# Patient Record
Sex: FEMALE | Race: WHITE | HISPANIC OR LATINO | Employment: FULL TIME | URBAN - METROPOLITAN AREA
[De-identification: names, ages, dates, MRNs, and addresses within clinical notes are randomized per-mention and may not be internally consistent; named-entity substitution may affect disease eponyms.]

---

## 2019-02-22 ENCOUNTER — OFFICE VISIT (OUTPATIENT)
Dept: URGENT CARE | Facility: PHYSICIAN GROUP | Age: 24
End: 2019-02-22
Payer: COMMERCIAL

## 2019-02-22 VITALS
HEIGHT: 63 IN | WEIGHT: 125 LBS | SYSTOLIC BLOOD PRESSURE: 104 MMHG | TEMPERATURE: 99.2 F | DIASTOLIC BLOOD PRESSURE: 74 MMHG | BODY MASS INDEX: 22.15 KG/M2 | HEART RATE: 92 BPM | RESPIRATION RATE: 13 BRPM | OXYGEN SATURATION: 99 %

## 2019-02-22 DIAGNOSIS — H66.91 RIGHT OTITIS MEDIA, UNSPECIFIED OTITIS MEDIA TYPE: ICD-10-CM

## 2019-02-22 PROCEDURE — 99203 OFFICE O/P NEW LOW 30 MIN: CPT | Performed by: FAMILY MEDICINE

## 2019-02-22 RX ORDER — AMOXICILLIN 875 MG/1
875 TABLET, COATED ORAL 2 TIMES DAILY
Qty: 14 TAB | Refills: 0 | Status: SHIPPED | OUTPATIENT
Start: 2019-02-22 | End: 2019-03-01

## 2019-02-22 ASSESSMENT — ENCOUNTER SYMPTOMS
MYALGIAS: 1
DIARRHEA: 0
WEIGHT LOSS: 0
EYE REDNESS: 0
EYE DISCHARGE: 0
VOMITING: 0
HEADACHES: 0

## 2019-02-22 NOTE — PROGRESS NOTES
"Subjective:      Tyesha Duran is a 23 y.o. female who presents with Otalgia (Right ear. Cold. Onset sunday)            5 days nasal congestion. No fever. 1 right earache. Muffled hearing. No discharge from hear. Intermittent tinnitus. No recent injury. No trauma/barotrauma. No PMH otitis media as adult. +PMH as child  No ST. Productive cough without blood in sputu. No SOB/wheeze. OTC cold medication with some improvement. No other aggravating or alleviating factors.          Review of Systems   Constitutional: Positive for malaise/fatigue. Negative for weight loss.   Eyes: Negative for discharge and redness.   Gastrointestinal: Negative for diarrhea and vomiting.   Musculoskeletal: Positive for myalgias. Negative for joint pain.   Skin: Negative for itching and rash.   Neurological: Negative for headaches.       .  Medications, Allergies, and current problem list reviewed today in Epic     Objective:     /74   Pulse 92   Temp 37.3 °C (99.2 °F)   Resp 13   Ht 1.6 m (5' 3\")   Wt 56.7 kg (125 lb)   SpO2 99%   BMI 22.14 kg/m²      Physical Exam   Constitutional: She is oriented to person, place, and time. She appears well-developed and well-nourished. No distress.   HENT:   Head: Normocephalic and atraumatic.   Left Ear: External ear normal.   Mouth/Throat: Oropharynx is clear and moist.   Right TM partially visualized due to cerumen and appears red.      Eyes: Conjunctivae are normal.   Neck: Neck supple.   Cardiovascular: Normal rate, regular rhythm and normal heart sounds.    No murmur heard.  Pulmonary/Chest: Effort normal and breath sounds normal.   Lymphadenopathy:     She has no cervical adenopathy.   Neurological: She is alert and oriented to person, place, and time.   Skin: Skin is warm.               Assessment/Plan:     1. Right otitis media, unspecified otitis media type  amoxicillin (AMOXIL) 875 MG tablet     Differential diagnosis, natural history, supportive care, and indications for " immediate follow-up discussed at length.     Decongestant    May take wait and see approach with abx.

## 2019-02-22 NOTE — LETTER
February 22, 2019         Patient: Tyesha Duran   YOB: 1995   Date of Visit: 2/22/2019           To Whom it May Concern:    Tyesha Duran was seen in my clinic on 2/22/2019. Please excuse today.       Sincerely,           Salty Rabago M.D.  Electronically Signed

## 2019-04-19 ENCOUNTER — OFFICE VISIT (OUTPATIENT)
Dept: URGENT CARE | Facility: PHYSICIAN GROUP | Age: 24
End: 2019-04-19
Payer: COMMERCIAL

## 2019-04-19 VITALS
HEIGHT: 63 IN | TEMPERATURE: 100.1 F | DIASTOLIC BLOOD PRESSURE: 62 MMHG | BODY MASS INDEX: 21.26 KG/M2 | OXYGEN SATURATION: 98 % | HEART RATE: 130 BPM | SYSTOLIC BLOOD PRESSURE: 100 MMHG | WEIGHT: 120 LBS

## 2019-04-19 DIAGNOSIS — R55 SYNCOPE, UNSPECIFIED SYNCOPE TYPE: ICD-10-CM

## 2019-04-19 PROCEDURE — 99213 OFFICE O/P EST LOW 20 MIN: CPT | Performed by: FAMILY MEDICINE

## 2019-04-19 ASSESSMENT — ENCOUNTER SYMPTOMS
AURA: 0
SYNCOPE: 1
FEVER: 1
BOWEL INCONTINENCE: 0
NAUSEA: 0
CONFUSION: 0
HEADACHES: 1
SLURRED SPEECH: 0
LIGHT-HEADEDNESS: 1

## 2019-04-19 NOTE — PROGRESS NOTES
"Subjective:   Tyesha Meyers is a 23 y.o. female who presents for Syncope (light headed x 1 day )     Syncope   This is a new problem. The current episode started today. The problem has been resolved. She lost consciousness for a period of less than 1 minute. The symptoms are aggravated by standing. Associated symptoms include a fever, headaches and light-headedness. Pertinent negatives include no aura, bladder incontinence, bowel incontinence, confusion, nausea or slurred speech.     Review of Systems   Constitutional: Positive for fever.   Cardiovascular: Positive for syncope.   Gastrointestinal: Negative for bowel incontinence and nausea.   Genitourinary: Negative for bladder incontinence.   Neurological: Positive for light-headedness and headaches.   Psychiatric/Behavioral: Negative for confusion.      Objective:   /62 (BP Location: Left arm, Patient Position: Sitting, BP Cuff Size: Adult)   Pulse (!) 130   Temp 37.8 °C (100.1 °F)   Ht 1.6 m (5' 3\")   Wt 54.4 kg (120 lb)   SpO2 98%   BMI 21.26 kg/m²   Physical Exam   Constitutional: She is oriented to person, place, and time. She appears well-developed and well-nourished. No distress.   HENT:   Head: Normocephalic and atraumatic.   Eyes: Pupils are equal, round, and reactive to light. Conjunctivae and EOM are normal.   Cardiovascular: Normal rate and regular rhythm.    No murmur heard.  Pulmonary/Chest: Effort normal and breath sounds normal. No respiratory distress.   Abdominal: Soft. She exhibits no distension. There is no tenderness.   Neurological: She is alert and oriented to person, place, and time. She has normal reflexes. No sensory deficit.   Skin: Skin is warm and dry.   Psychiatric: She has a normal mood and affect.        Assessment/Plan:   1. Syncope, unspecified syncope type  Unclear etiology though likely related to evolving illness. RTC PRN progressive symptoms. Recommend oral hydration protocols.  Differential diagnosis, " natural history, supportive care, and indications for immediate follow-up discussed.

## 2019-04-19 NOTE — LETTER
April 19, 2019         Patient: Tyesha Meyers   YOB: 1995   Date of Visit: 4/19/2019           To Whom it May Concern:    Tyesha Meyers was seen in my clinic on 4/19/2019..    If you have any questions or concerns, please don't hesitate to call.        Sincerely,           Ted Ortiz M.D.  Electronically Signed

## 2019-04-19 NOTE — PATIENT INSTRUCTIONS
Syncope  Syncope is when you temporarily lose consciousness. Syncope may also be called fainting or passing out. It is caused by a sudden decrease in blood flow to the brain. Even though most causes of syncope are not dangerous, syncope can be a sign of a serious medical problem. Signs that you may be about to faint include:  · Feeling dizzy or light-headed.  · Feeling nauseous.  · Seeing all white or all black in your field of vision.  · Having cold, clammy skin.  If you fainted, get medical help right away.Call your local emergency services (911 in the [place] Do not drive yourself to the hospital.  Follow these instructions at home:  Pay attention to any changes in your symptoms. Take these actions to help with your condition:  · Have someone stay with you until you feel stable.  · Do not drive, use machinery, or play sports until your health care provider says it is okay.  · Keep all follow-up visits as told by your health care provider. This is important.  · If you start to feel like you might faint, lie down right away and raise (elevate) your feet above the level of your heart. Breathe deeply and steadily. Wait until all of the symptoms have passed.  · Drink enough fluid to keep your urine clear or pale yellow.  · If you are taking blood pressure or heart medicine, get up slowly and take several minutes to sit and then stand. This can reduce dizziness.  · Take over-the-counter and prescription medicines only as told by your health care provider.  Get help right away if:  · You have a severe headache.  · You have unusual pain in your chest, abdomen, or back.  · You are bleeding from your mouth or rectum, or you have black or tarry stool.  · You have a very fast or irregular heartbeat (palpitations).  · You have pain with breathing.  · You faint once or repeatedly.  · You have a seizure.  · You are confused.  · You have trouble walking.  · You have severe weakness.  · You have vision problems.  These symptoms  may represent a serious problem that is an emergency. Do not wait to see if your symptoms will go away. Get medical help right away. Call your local emergency services (911 in the [place] Do not drive yourself to the hospital.   This information is not intended to replace advice given to you by your health care provider. Make sure you discuss any questions you have with your health care provider.  Document Released: 12/18/2006 Document Revised: 05/25/2017 Document Reviewed: 08/31/2016  Elsevier Interactive Patient Education © 2017 [employer]  -

## 2019-12-13 ENCOUNTER — NON-PROVIDER VISIT (OUTPATIENT)
Dept: OCCUPATIONAL MEDICINE | Facility: CLINIC | Age: 24
End: 2019-12-13

## 2019-12-13 DIAGNOSIS — Z02.1 PRE-EMPLOYMENT HEALTH SCREENING EXAMINATION: ICD-10-CM

## 2019-12-13 DIAGNOSIS — Z02.1 PRE-EMPLOYMENT DRUG SCREENING: ICD-10-CM

## 2019-12-13 LAB
AMP AMPHETAMINE: NORMAL
COC COCAINE: NORMAL
INT CON NEG: NORMAL
INT CON POS: NORMAL
MET METHAMPHETAMINES: NORMAL
OPI OPIATES: NORMAL
PCP PHENCYCLIDINE: NORMAL
POC DRUG COMMENT 753798-OCCUPATIONAL HEALTH: NEGATIVE
THC: NORMAL

## 2019-12-13 PROCEDURE — 80305 DRUG TEST PRSMV DIR OPT OBS: CPT | Performed by: NURSE PRACTITIONER

## 2021-02-16 ENCOUNTER — OFFICE VISIT (OUTPATIENT)
Dept: URGENT CARE | Facility: PHYSICIAN GROUP | Age: 26
End: 2021-02-16
Payer: COMMERCIAL

## 2021-02-16 VITALS
WEIGHT: 137.4 LBS | TEMPERATURE: 98.2 F | BODY MASS INDEX: 24.34 KG/M2 | SYSTOLIC BLOOD PRESSURE: 116 MMHG | HEART RATE: 85 BPM | DIASTOLIC BLOOD PRESSURE: 70 MMHG | HEIGHT: 63 IN | OXYGEN SATURATION: 98 % | RESPIRATION RATE: 16 BRPM

## 2021-02-16 DIAGNOSIS — L28.2 PRURITIC RASH: ICD-10-CM

## 2021-02-16 PROCEDURE — 99204 OFFICE O/P NEW MOD 45 MIN: CPT | Performed by: PHYSICIAN ASSISTANT

## 2021-02-16 RX ORDER — TRIAMCINOLONE ACETONIDE 1 MG/G
OINTMENT TOPICAL
Qty: 45 G | Refills: 0 | Status: SHIPPED | OUTPATIENT
Start: 2021-02-16 | End: 2021-03-10

## 2021-02-17 ENCOUNTER — TELEPHONE (OUTPATIENT)
Dept: SCHEDULING | Facility: IMAGING CENTER | Age: 26
End: 2021-02-17

## 2021-02-17 ASSESSMENT — ENCOUNTER SYMPTOMS
CHILLS: 0
SHORTNESS OF BREATH: 0
FEVER: 0

## 2021-02-17 NOTE — PROGRESS NOTES
"  Subjective:   Armin Duran is a 25 y.o. female who presents today with   Chief Complaint   Patient presents with   • Allergic Reaction     poss allergic reaction, neck itchiness, rash located on shoulder/back, x2 days        Rash  This is a new problem. Episode onset: 2 days. The problem is unchanged. The rash is diffuse. The rash is characterized by itchiness and redness. Pertinent negatives include no fever or shortness of breath. Past treatments include antihistamine. The treatment provided mild relief.   No new soaps, detergents or potential exposure to other allergens.  No other systemic symptoms.    PMH:  has no past medical history on file.  MEDS:   Current Outpatient Medications:   •  triamcinolone acetonide (KENALOG) 0.1 % Ointment, Apply thin layer to affected area twice daily for up to 2 weeks., Disp: 45 g, Rfl: 0  ALLERGIES: No Known Allergies  SURGHX: History reviewed. No pertinent surgical history.  SOCHX:  reports that she has never smoked. She has never used smokeless tobacco. She reports previous alcohol use.  FH: Reviewed with patient, not pertinent to this visit.       Review of Systems   Constitutional: Negative for chills and fever.   Respiratory: Negative for shortness of breath.    Skin: Positive for itching and rash.        Objective:   /70 (BP Location: Right arm, Patient Position: Sitting, BP Cuff Size: Adult)   Pulse 85   Temp 36.8 °C (98.2 °F) (Temporal)   Resp 16   Ht 1.6 m (5' 3\")   Wt 62.3 kg (137 lb 6.4 oz)   SpO2 98%   BMI 24.34 kg/m²   Physical Exam  Vitals and nursing note reviewed.   Constitutional:       General: She is not in acute distress.     Appearance: Normal appearance. She is well-developed. She is not ill-appearing or toxic-appearing.   HENT:      Head: Normocephalic and atraumatic.      Right Ear: Hearing normal.      Left Ear: Hearing normal.      Mouth/Throat:      Comments: Patent airway. No lip or tongue swelling  Eyes:      Conjunctiva/sclera: " Conjunctivae normal.   Cardiovascular:      Rate and Rhythm: Normal rate and regular rhythm.      Heart sounds: Normal heart sounds.   Pulmonary:      Effort: Pulmonary effort is normal.      Breath sounds: Normal breath sounds. No wheezing, rhonchi or rales.   Musculoskeletal:      Comments: Normal movement in all 4 extremities   Skin:     General: Skin is warm and dry.             Comments: Diffuse papular rash with mild erythema.    Neurological:      Mental Status: She is alert.      Coordination: Coordination normal.   Psychiatric:         Mood and Affect: Mood normal.           Assessment/Plan:   Assessment    1. Pruritic rash  - triamcinolone acetonide (KENALOG) 0.1 % Ointment; Apply thin layer to affected area twice daily for up to 2 weeks.  Dispense: 45 g; Refill: 0  Recommend use of non drowsy antihistamine OTC per 's instructions such as Zyrtec. Likely contact dermatitis unknown cause. Offered allergy referral.   Differential diagnosis, natural history, supportive care, and indications for immediate follow-up discussed.   Patient given instructions and understanding of medications and treatment.    No signs of angioedema and no steroids or injection in clinic warranted although we did discuss this option and patient agreeable with using oral antihistamine instead as it has been working.  If not improving in 3-5 days, F/U with PCP or return to  if symptoms worsen.    Patient agreeable to plan.      Please note that this dictation was created using voice recognition software. I have made every reasonable attempt to correct obvious errors, but I expect that there are errors of grammar and possibly content that I did not discover before finalizing the note.    Omari Gamino PA-C

## 2021-03-10 ENCOUNTER — OFFICE VISIT (OUTPATIENT)
Dept: MEDICAL GROUP | Facility: PHYSICIAN GROUP | Age: 26
End: 2021-03-10
Payer: COMMERCIAL

## 2021-03-10 VITALS
SYSTOLIC BLOOD PRESSURE: 90 MMHG | RESPIRATION RATE: 16 BRPM | WEIGHT: 138.8 LBS | HEART RATE: 87 BPM | DIASTOLIC BLOOD PRESSURE: 56 MMHG | OXYGEN SATURATION: 98 % | TEMPERATURE: 97.8 F | BODY MASS INDEX: 24.59 KG/M2 | HEIGHT: 63 IN

## 2021-03-10 DIAGNOSIS — T78.40XD ALLERGY, SUBSEQUENT ENCOUNTER: ICD-10-CM

## 2021-03-10 DIAGNOSIS — R42 EPISODIC LIGHTHEADEDNESS: ICD-10-CM

## 2021-03-10 DIAGNOSIS — Z87.898 HISTORY OF ANGIOEDEMA: ICD-10-CM

## 2021-03-10 DIAGNOSIS — Z23 NEED FOR VACCINATION: ICD-10-CM

## 2021-03-10 DIAGNOSIS — Z13.6 SCREENING FOR CARDIOVASCULAR CONDITION: ICD-10-CM

## 2021-03-10 PROCEDURE — 90621 MENB-FHBP VACC 2/3 DOSE IM: CPT | Performed by: PHYSICIAN ASSISTANT

## 2021-03-10 PROCEDURE — 99214 OFFICE O/P EST MOD 30 MIN: CPT | Mod: 25 | Performed by: PHYSICIAN ASSISTANT

## 2021-03-10 PROCEDURE — 90686 IIV4 VACC NO PRSV 0.5 ML IM: CPT | Performed by: PHYSICIAN ASSISTANT

## 2021-03-10 PROCEDURE — 90471 IMMUNIZATION ADMIN: CPT | Performed by: PHYSICIAN ASSISTANT

## 2021-03-10 PROCEDURE — 90472 IMMUNIZATION ADMIN EACH ADD: CPT | Performed by: PHYSICIAN ASSISTANT

## 2021-03-10 RX ORDER — CHOLECALCIFEROL (VITAMIN D3) 50 MCG
2000 TABLET ORAL DAILY
COMMUNITY

## 2021-03-10 RX ORDER — EPINEPHRINE 0.3 MG/.3ML
0.3 INJECTION SUBCUTANEOUS ONCE
Qty: 1 EACH | Refills: 0 | Status: SHIPPED | OUTPATIENT
Start: 2021-03-10 | End: 2021-03-10

## 2021-03-10 ASSESSMENT — PATIENT HEALTH QUESTIONNAIRE - PHQ9: CLINICAL INTERPRETATION OF PHQ2 SCORE: 0

## 2021-03-10 NOTE — LETTER
[facility]  Marleni Bhatia P.A.-C.  [address]  [place] [address]  Fax: [phone]   Authorization for Release/Disclosure of   Protected Health Information   Name: TYESHA RUSSO : 1995 SSN: [id number]   Address: [address] [place] [address] Phone:    [phone] (home)    I authorize the entity listed below to release/disclose the PHI below to:   [facility]/Marleni Bhatia P.A.-C. and Marleni Bhatia P.A.-C.   Provider or Entity Name: [facility]     Address   City, State, [facility]   Phone:     Fax: [phone]   Reason for request: continuity of care   Information to be released:    [  ] LAST COLONOSCOPY,  including any PATH REPORT and follow-up  [  ] LAST FIT/COLOGUARD RESULT [  ] LAST DEXA  [  ] LAST MAMMOGRAM  [  ] LAST PAP  [  ] LAST LABS [  ] RETINA EXAM REPORT  [  ] IMMUNIZATION RECORDS  [x] Release all info      [  ] Check here and initial the line next to each item to release ALL health information INCLUDING  _____ Care and treatment for drug and / or alcohol abuse  _____ HIV testing, infection status, or AIDS  _____ Genetic Testing    DATES OF SERVICE OR TIME PERIOD TO BE DISCLOSED: _____________  I understand and acknowledge that:  * This Authorization may be revoked at any time by you in writing, except if your health information has already been used or disclosed.  * Your health information that will be used or disclosed as a result of you signing this authorization could be re-disclosed by the recipient. If this occurs, your re-disclosed health information may no longer be protected by State or Federal laws.  * You may refuse to sign this Authorization. Your refusal will not affect your ability to obtain treatment.  * This Authorization becomes effective upon signing and will  on (date) __________.      If no date is indicated, this Authorization will  one (1) year from the signature date.    Name: Tyesha Roger Russo    Signature:    Date:     3/10/2021       PLEASE FAX REQUESTED RECORDS BACK TO: [phone]

## 2021-03-10 NOTE — LETTER
[facility]  Marleni Bhatia P.A.-C.  [address]  [place] [address]  Fax: [phone]   Authorization for Release/Disclosure of   Protected Health Information   Name: TYESHA RUSSO : 1995 SSN: [id number]   Address: [address] [place] [address] Phone:    [phone] (home)    I authorize the entity listed below to release/disclose the PHI below to:   [facility]/Marleni Bhatia P.A.-C. and Marleni Bhatia P.A.-C.   Provider or Entity Name: [facility]      Address   City, State, Zip   Phone:      Fax:     Reason for request: continuity of care   Information to be released:    [  ] LAST COLONOSCOPY,  including any PATH REPORT and follow-up  [  ] LAST FIT/COLOGUARD RESULT [  ] LAST DEXA  [  ] LAST MAMMOGRAM  [  ] LAST PAP  [  ] LAST LABS [  ] RETINA EXAM REPORT  [  ] IMMUNIZATION RECORDS  [x] Release all info      [  ] Check here and initial the line next to each item to release ALL health information INCLUDING  _____ Care and treatment for drug and / or alcohol abuse  _____ HIV testing, infection status, or AIDS  _____ Genetic Testing    DATES OF SERVICE OR TIME PERIOD TO BE DISCLOSED: _____________  I understand and acknowledge that:  * This Authorization may be revoked at any time by you in writing, except if your health information has already been used or disclosed.  * Your health information that will be used or disclosed as a result of you signing this authorization could be re-disclosed by the recipient. If this occurs, your re-disclosed health information may no longer be protected by State or Federal laws.  * You may refuse to sign this Authorization. Your refusal will not affect your ability to obtain treatment.  * This Authorization becomes effective upon signing and will  on (date) __________.      If no date is indicated, this Authorization will  one (1) year from the signature date.    Name: Tyesha Russo    Signature:   Date:     3/10/2021        PLEASE FAX REQUESTED RECORDS BACK TO: [phone]

## 2021-03-10 NOTE — LETTER
[facility]  Marleni Bhatia P.A.-C.  [address]  [place] [address]  Fax: [phone]   Authorization for Release/Disclosure of   Protected Health Information   Name: TYESHA MEYERS : 1995 SSN: [id number]   Address: [address] [place] [address] Phone:    [phone] (home)    I authorize the entity listed below to release/disclose the PHI below to:   [facility]/Marleni Bhatia P.A.-C. and Marleni Bhatia P.A.-C.   Provider or Entity Name: [facility]      Address   City, [facility]   Phone:      Fax:     Reason for request: continuity of care   Information to be released:    [  ] LAST COLONOSCOPY,  including any PATH REPORT and follow-up  [  ] LAST FIT/COLOGUARD RESULT [  ] LAST DEXA  [  ] LAST MAMMOGRAM  [x] LAST PAP  [  ] LAST LABS [  ] RETINA EXAM REPORT  [  ] IMMUNIZATION RECORDS  [  ] Release all info      [  ] Check here and initial the line next to each item to release ALL health information INCLUDING  _____ Care and treatment for drug and / or alcohol abuse  _____ HIV testing, infection status, or AIDS  _____ Genetic Testing    DATES OF SERVICE OR TIME PERIOD TO BE DISCLOSED: _____________  I understand and acknowledge that:  * This Authorization may be revoked at any time by you in writing, except if your health information has already been used or disclosed.  * Your health information that will be used or disclosed as a result of you signing this authorization could be re-disclosed by the recipient. If this occurs, your re-disclosed health information may no longer be protected by State or Federal laws.  * You may refuse to sign this Authorization. Your refusal will not affect your ability to obtain treatment.  * This Authorization becomes effective upon signing and will  on (date) __________.      If no date is indicated, this Authorization will  one (1) year from the signature date.    Name: Tyesha Meyers    Signature:   Date:     3/10/2021            PLEASE FAX REQUESTED RECORDS BACK TO: [phone]

## 2021-03-10 NOTE — PROGRESS NOTES
Chief Complaint   Patient presents with   • Establish Care     No concerns        HISTORY OF PRESENT ILLNESS: Tyesha Meyers is an established 25 y.o. female here to discuss the evaluation and management of:      Episodic lightheadedness  Patient states intermittently episodic lightheadedness.  She tells me recently she is experiencing shaking hands pain.  States she is improved at contain sugar and symptoms resolved.  She is unsure when the last time she had eaten prior to developing symptoms.  Patient's blood pressure is 90/56 mmHg.  She tells me that her blood pressure runs low.  She feels that she gets adequate caloric intake per day.  States she stays hydrated throughout the day.  On average sleeps 6 to 7 hours per night.  Denies chest pain, heart palpitations, syncope, severe headache, vision changes, peripheral edema, tachycardia, tachypnea, melena, hematochezia, blood loss, abnormal hair loss, dry/brittle hair, dry skin, increased irritability, heart palpitations, changes in bowel habit, hoarseness of voice.    History of angioedema  Allergy, subsequent encounter  Patient tells me she has a history of angioedema.  States he recalls one time that her lips were swollen and developed hives around her mouth.  Intermittently she will variance body hives.  Recently was seen in urgent care for hives of neck, back, and bilateral arms.  She was given topical Kenalog cream with resolution of symptoms.  She was told that it is a contact dermatitis.  Patient states the past she followed up with an allergist but food allergies were not tested.  Patient would like to follow back up with an allergist.  Asymptomatic at this time.  Patient does not have EpiPen's on hand.        There are no problems to display for this patient.      Allergies:Patient has no known allergies.    Current Outpatient Medications   Medication Sig Dispense Refill   • EPINEPHrine (EPIPEN) 0.3 MG/0.3ML Solution Auto-injector solution for  injection Inject 0.3 mL into the shoulder, thigh, or buttocks one time for 1 dose. 1 Each 0   • Cyanocobalamin 1500 MCG TABLET DISPERSIBLE Take 1 tablet by mouth every day.     • Multiple Vitamin (MULTI-VITAMIN DAILY PO) Take 1 tablet by mouth every day.     • vitamin D (CHOLECALCIFEROL) 1000 Unit (25 mcg) Tab Take 1,000 Units by mouth every day.       No current facility-administered medications for this visit.       Social History     Tobacco Use   • Smoking status: Never Smoker   • Smokeless tobacco: Never Used   Substance Use Topics   • Alcohol use: Not Currently   • Drug use: Never       Family Status   Relation Name Status   • Mo  Alive   • Fa  Alive   • Sis  Alive   • Bro  Alive   • Sis  Alive     Family History   Problem Relation Age of Onset   • Hypertension Mother    • No Known Problems Father    • No Known Problems Sister    • No Known Problems Brother    • No Known Problems Sister        ROS:  Review of Systems   Constitutional: Negative for fever, chills, weight loss and malaise/fatigue.   HENT: Negative for ear pain, nosebleeds, congestion, sore throat and neck pain.    Eyes: Negative for blurred vision.   Respiratory: Negative for cough, sputum production, shortness of breath and wheezing.    Cardiovascular: Negative for chest pain, palpitations, orthopnea and leg swelling.   Gastrointestinal: Negative for heartburn, nausea, vomiting and abdominal pain.   Genitourinary: Negative for dysuria, urgency and frequency.   Musculoskeletal: Negative for myalgias, back pain and joint pain.   Skin: Negative for rash and itching.   Neurological: Negative for dizziness, tingling, tremors, sensory change, focal weakness and headaches.   Endo/Heme/Allergies: Does not bruise/bleed easily.   Psychiatric/Behavioral: Negative for depression, suicidal ideas and memory loss.  The patient is not nervous/anxious and does not have insomnia.    All other systems reviewed and are negative except as in HPI.    Exam: BP (!)  "90/56 (BP Location: Left arm, Patient Position: Sitting, BP Cuff Size: Adult)   Pulse 87   Temp 36.6 °C (97.8 °F) (Temporal)   Resp 16   Ht 1.6 m (5' 3\")   Wt 63 kg (138 lb 12.8 oz)   SpO2 98%  Body mass index is 24.59 kg/m².  General: Normal appearing. No distress.  HEENT: Normocephalic. Eyes conjunctiva clear lids without ptosis,ears normal shape and contour.  Neck: Supple without JVD. Thyroid is not enlarged.  Pulmonary: Clear to ausculation.  Normal effort. No rales, ronchi, or wheezing.  Cardiovascular: Regular rate and rhythm without murmur.   Abdomen: Soft, nontender, nondistended.   Neurologic: Grossly nonfocal.  Cranial nerves are normal.   Skin: Warm and dry.  No rashes or suspicious skin lesions.  Musculoskeletal: Normal gait. No extremity cyanosis, clubbing, or edema.  Psych: Normal mood and affect. Alert and oriented x3. Judgment and insight is normal.    Medical decision-making and discussion:  1. Episodic lightheadedness  Blood pressure peers to run low.  Patient blood pressure during today's appointment 90/Hg.  Discussed hypoglycemia with patient.  Advised patient to keep on her at all times.  Advised patient work on diet, exercise, hydration, and sleep hygiene.  Lab work has been ordered.  Patient follow-up in 6 weeks for evaluation.  Continue vitamin D, B12, multivitamin.    - Comp Metabolic Panel; Future  - CBC WITH DIFFERENTIAL; Future  - VITAMIN B12; Future  - TSH WITH REFLEX TO FT4; Future  - VITAMIN D,25 HYDROXY; Future    2. History of angioedema  3. Allergy, subsequent encounter  Advised patient to keep a diary.  Avoid known triggers.  She is referred to an allergist for further evaluation.  Patient has improved prior to EpiPen's.  Discussed how to use EpiPen's with patient in great detail.  Discussed ED precautions with patient.    Follow-up for worsening symptoms,lack of expected recovery, or should new symptoms or problems arise.      - EPINEPHrine (EPIPEN) 0.3 MG/0.3ML Solution " Auto-injector solution for injection; Inject 0.3 mL into the shoulder, thigh, or buttocks one time for 1 dose.  Dispense: 1 Each; Refill: 0  - REFERRAL TO ALLERGY    4. Screening for cardiovascular condition  Lab work has been ordered.  Patient follow-up in 6 weeks for evaluation.    - Lipid Profile; Future    5. Need for vaccination  Vaccinations were administered to patient without complications.  Patient was provided VIS forms.    - Influenza Vaccine Quad Injection (PF)  - Meningococcal Vaccine Serogroup B 2-3 Dose IM      Please note that this dictation was created using voice recognition software. I have made every reasonable attempt to correct obvious errors, but I expect that there are errors of grammar and possibly content that I did not discover before finalizing the note.    Assessment/Plan:  1. Episodic lightheadedness  Comp Metabolic Panel    CBC WITH DIFFERENTIAL    VITAMIN B12    TSH WITH REFLEX TO FT4    VITAMIN D,25 HYDROXY   2. History of angioedema  EPINEPHrine (EPIPEN) 0.3 MG/0.3ML Solution Auto-injector solution for injection    REFERRAL TO ALLERGY   3. Allergy, subsequent encounter  EPINEPHrine (EPIPEN) 0.3 MG/0.3ML Solution Auto-injector solution for injection    REFERRAL TO ALLERGY   4. Screening for cardiovascular condition  Lipid Profile   5. Need for vaccination  Influenza Vaccine Quad Injection (PF)    Meningococcal Vaccine Serogroup B 2-3 Dose IM       Return in about 6 weeks (around 4/21/2021).

## 2021-03-29 ENCOUNTER — HOSPITAL ENCOUNTER (OUTPATIENT)
Dept: LAB | Facility: MEDICAL CENTER | Age: 26
End: 2021-03-29
Attending: PHYSICIAN ASSISTANT
Payer: COMMERCIAL

## 2021-03-29 DIAGNOSIS — R42 EPISODIC LIGHTHEADEDNESS: ICD-10-CM

## 2021-03-29 DIAGNOSIS — Z13.6 SCREENING FOR CARDIOVASCULAR CONDITION: ICD-10-CM

## 2021-03-29 LAB
25(OH)D3 SERPL-MCNC: 29 NG/ML (ref 30–100)
ALBUMIN SERPL BCP-MCNC: 4.7 G/DL (ref 3.2–4.9)
ALBUMIN/GLOB SERPL: 1.6 G/DL
ALP SERPL-CCNC: 59 U/L (ref 30–99)
ALT SERPL-CCNC: 19 U/L (ref 2–50)
ANION GAP SERPL CALC-SCNC: 8 MMOL/L (ref 7–16)
AST SERPL-CCNC: 26 U/L (ref 12–45)
BASOPHILS # BLD AUTO: 0.9 % (ref 0–1.8)
BASOPHILS # BLD: 0.05 K/UL (ref 0–0.12)
BILIRUB SERPL-MCNC: 0.5 MG/DL (ref 0.1–1.5)
BUN SERPL-MCNC: 8 MG/DL (ref 8–22)
CALCIUM SERPL-MCNC: 9.8 MG/DL (ref 8.5–10.5)
CHLORIDE SERPL-SCNC: 101 MMOL/L (ref 96–112)
CHOLEST SERPL-MCNC: 221 MG/DL (ref 100–199)
CO2 SERPL-SCNC: 27 MMOL/L (ref 20–33)
CREAT SERPL-MCNC: 0.58 MG/DL (ref 0.5–1.4)
EOSINOPHIL # BLD AUTO: 0.15 K/UL (ref 0–0.51)
EOSINOPHIL NFR BLD: 2.6 % (ref 0–6.9)
ERYTHROCYTE [DISTWIDTH] IN BLOOD BY AUTOMATED COUNT: 50.2 FL (ref 35.9–50)
FASTING STATUS PATIENT QL REPORTED: NORMAL
GLOBULIN SER CALC-MCNC: 2.9 G/DL (ref 1.9–3.5)
GLUCOSE SERPL-MCNC: 83 MG/DL (ref 65–99)
HCT VFR BLD AUTO: 44 % (ref 37–47)
HDLC SERPL-MCNC: 88 MG/DL
HGB BLD-MCNC: 14.3 G/DL (ref 12–16)
IMM GRANULOCYTES # BLD AUTO: 0.01 K/UL (ref 0–0.11)
IMM GRANULOCYTES NFR BLD AUTO: 0.2 % (ref 0–0.9)
LDLC SERPL CALC-MCNC: 102 MG/DL
LYMPHOCYTES # BLD AUTO: 2.71 K/UL (ref 1–4.8)
LYMPHOCYTES NFR BLD: 47.1 % (ref 22–41)
MCH RBC QN AUTO: 29.9 PG (ref 27–33)
MCHC RBC AUTO-ENTMCNC: 32.5 G/DL (ref 33.6–35)
MCV RBC AUTO: 91.9 FL (ref 81.4–97.8)
MONOCYTES # BLD AUTO: 0.38 K/UL (ref 0–0.85)
MONOCYTES NFR BLD AUTO: 6.6 % (ref 0–13.4)
NEUTROPHILS # BLD AUTO: 2.45 K/UL (ref 2–7.15)
NEUTROPHILS NFR BLD: 42.6 % (ref 44–72)
NRBC # BLD AUTO: 0 K/UL
NRBC BLD-RTO: 0 /100 WBC
PLATELET # BLD AUTO: 265 K/UL (ref 164–446)
PMV BLD AUTO: 10.6 FL (ref 9–12.9)
POTASSIUM SERPL-SCNC: 4.5 MMOL/L (ref 3.6–5.5)
PROT SERPL-MCNC: 7.6 G/DL (ref 6–8.2)
RBC # BLD AUTO: 4.79 M/UL (ref 4.2–5.4)
SODIUM SERPL-SCNC: 136 MMOL/L (ref 135–145)
TRIGL SERPL-MCNC: 156 MG/DL (ref 0–149)
TSH SERPL DL<=0.005 MIU/L-ACNC: 1.27 UIU/ML (ref 0.38–5.33)
VIT B12 SERPL-MCNC: 975 PG/ML (ref 211–911)
WBC # BLD AUTO: 5.8 K/UL (ref 4.8–10.8)

## 2021-03-29 PROCEDURE — 84443 ASSAY THYROID STIM HORMONE: CPT

## 2021-03-29 PROCEDURE — 36415 COLL VENOUS BLD VENIPUNCTURE: CPT

## 2021-03-29 PROCEDURE — 80061 LIPID PANEL: CPT

## 2021-03-29 PROCEDURE — 80053 COMPREHEN METABOLIC PANEL: CPT

## 2021-03-29 PROCEDURE — 85025 COMPLETE CBC W/AUTO DIFF WBC: CPT

## 2021-03-29 PROCEDURE — 82607 VITAMIN B-12: CPT

## 2021-03-29 PROCEDURE — 82306 VITAMIN D 25 HYDROXY: CPT

## 2021-04-21 ENCOUNTER — OFFICE VISIT (OUTPATIENT)
Dept: MEDICAL GROUP | Facility: PHYSICIAN GROUP | Age: 26
End: 2021-04-21
Payer: COMMERCIAL

## 2021-04-21 VITALS
BODY MASS INDEX: 24.27 KG/M2 | HEIGHT: 63 IN | WEIGHT: 137 LBS | SYSTOLIC BLOOD PRESSURE: 90 MMHG | HEART RATE: 92 BPM | TEMPERATURE: 98.1 F | OXYGEN SATURATION: 100 % | RESPIRATION RATE: 20 BRPM | DIASTOLIC BLOOD PRESSURE: 60 MMHG

## 2021-04-21 DIAGNOSIS — E78.2 MIXED HYPERLIPIDEMIA: ICD-10-CM

## 2021-04-21 DIAGNOSIS — E55.9 VITAMIN D INSUFFICIENCY: ICD-10-CM

## 2021-04-21 PROCEDURE — 99213 OFFICE O/P EST LOW 20 MIN: CPT | Performed by: PHYSICIAN ASSISTANT

## 2021-04-21 ASSESSMENT — FIBROSIS 4 INDEX: FIB4 SCORE: 0.52

## 2021-04-21 NOTE — PROGRESS NOTES
Chief Complaint   Patient presents with   • Lab Results     follow up        HISTORY OF PRESENT ILLNESS: Tyesha Meyers is an established 25 y.o. female here to discuss the evaluation and management of:    Patient is a pleasant 25-year-old female here today to discuss lab work results.  Discussed lipid profile lab work results from 3/29/2021 with patient.  Results are as follows:     Ref Range & Units 3 wk ago   Cholesterol,Tot 100 - 199 mg/dL 221High     Triglycerides 0 - 149 mg/dL 156High     HDL >=40 mg/dL 88    LDL <100 mg/dL 102High       Patient tells me that she is a past continuing.  She needs for she eats more carbohydrates and sugar than she should.  States she needs to improve on exercising more regularly.  She does tell me that high cholesterol runs in her family.    Vitamin D level was 29 on 3/29/2021.  Patient states since getting her vitamin D lab work results and she started taking over-the-counter 2000's of vitamin D once daily.  Denies side effects or complications from supplementation.    Patient's B12 was slightly elevated.  Patient states she is currently taking 1500 MCG of B12 once daily.  Patient is asymptomatic.      There are no problems to display for this patient.      Allergies:Patient has no known allergies.    Current Outpatient Medications   Medication Sig Dispense Refill   • Cyanocobalamin 1500 MCG TABLET DISPERSIBLE Take 1 tablet by mouth every day.     • Multiple Vitamin (MULTI-VITAMIN DAILY PO) Take 1 tablet by mouth every day.     • Cholecalciferol (VITAMIN D) 2000 UNIT Tab Take 2,000 Units by mouth every day.       No current facility-administered medications for this visit.       Social History     Tobacco Use   • Smoking status: Never Smoker   • Smokeless tobacco: Never Used   Substance Use Topics   • Alcohol use: Not Currently   • Drug use: Never       Family Status   Relation Name Status   • Mo  Alive   • Fa  Alive   • Sis  Alive   • Bro  Alive   • Sis  Alive  "    Family History   Problem Relation Age of Onset   • Hypertension Mother    • No Known Problems Father    • No Known Problems Sister    • No Known Problems Brother    • No Known Problems Sister        ROS:  Review of Systems   Constitutional: Negative for fever, chills, weight loss and malaise/fatigue.   HENT: Negative for ear pain, nosebleeds, congestion, sore throat and neck pain.    Eyes: Negative for blurred vision.   Respiratory: Negative for cough, sputum production, shortness of breath and wheezing.    Cardiovascular: Negative for chest pain, palpitations, orthopnea and leg swelling.   Gastrointestinal: Negative for heartburn, nausea, vomiting and abdominal pain.   Genitourinary: Negative for dysuria, urgency and frequency.   Musculoskeletal: Negative for myalgias, back pain and joint pain.   Skin: Negative for rash and itching.   Neurological: Negative for dizziness, tingling, tremors, sensory change, focal weakness and headaches.   Endo/Heme/Allergies: Does not bruise/bleed easily.   Psychiatric/Behavioral: Negative for depression, suicidal ideas and memory loss.  The patient is not nervous/anxious and does not have insomnia.    All other systems reviewed and are negative except as in HPI.    Exam: BP (!) 90/60 (BP Location: Left arm, Patient Position: Sitting, BP Cuff Size: Adult)   Pulse 92   Temp 36.7 °C (98.1 °F) (Temporal)   Resp 20   Ht 1.6 m (5' 3\")   Wt 62.1 kg (137 lb)   SpO2 100%  Body mass index is 24.27 kg/m².  General: Normal appearing. No distress.  HEENT: Normocephalic. Eyes conjunctiva clear lids without ptosis, ears normal shape and contour.  Neck: Supple without JVD. Thyroid is not enlarged.  Pulmonary: Clear to ausculation.  Normal effort. No rales, ronchi, or wheezing.  Cardiovascular: Regular rate and rhythm without murmur.   Abdomen: Nondistended.   Neurologic: Grossly nonfocal.  Cranial nerves are normal.   Skin: Warm and dry.  No rashes or suspicious skin " lesions.  Musculoskeletal: Normal gait. No extremity cyanosis, clubbing, or edema.  Psych: Normal mood and affect. Alert and oriented x3. Judgment and insight is normal.    Medical decision-making and discussion:  1. Mixed hyperlipidemia  New diagnosis.  Discussed recent lipid profile lab work results with patient.  Advised patient to decrease carbohydrate and sugar consumption.  Statin medication not indicated.  Patient repeat lab work in 6 months for further evaluation.  She does admit during today's appointment she believes that she did eat pasta the night before completing lab work.  Patient has a positive family history for hyperlipidemia.    - Encouraged diet high in fruits, vegetables, and fiber. And a diet low in salt, refined carbohydrates, cholesterol, saturated fat, and trans fatty acids.    - Encouraged  a minimum of 30 minutes of moderate intensity aerobic exercise (eg, brisk walking) is recommended on five days each week. Or 20 minutes of vigorous-intensity aerobic exercise (eg, jogging) on three days each week.       - Lipid Profile; Future    2. Vitamin D insufficiency  Chronic problem.  Uncontrolled.  Advised patient continue over-the-counter 2000's of vitamin D once daily.  Patient repeat lab work in 6 months for evaluation.  She will be contacted with results.  - VITAMIN D,25 HYDROXY; Future    Discussed recent B12 lab work results with patient.  B12 slightly elevated.  Suggested patient take B12 supplementation every other day.    Please note that this dictation was created using voice recognition software. I have made every reasonable attempt to correct obvious errors, but I expect that there are errors of grammar and possibly content that I did not discover before finalizing the note.    Assessment/Plan:  1. Mixed hyperlipidemia  Lipid Profile   2. Vitamin D insufficiency  VITAMIN D,25 HYDROXY       Return in about 1 year (around 4/21/2022), or if symptoms worsen or fail to improve.

## 2021-07-01 ENCOUNTER — OFFICE VISIT (OUTPATIENT)
Dept: MEDICAL GROUP | Facility: PHYSICIAN GROUP | Age: 26
End: 2021-07-01
Payer: COMMERCIAL

## 2021-07-01 VITALS
BODY MASS INDEX: 22.86 KG/M2 | HEART RATE: 81 BPM | HEIGHT: 63 IN | RESPIRATION RATE: 16 BRPM | DIASTOLIC BLOOD PRESSURE: 70 MMHG | TEMPERATURE: 97.8 F | SYSTOLIC BLOOD PRESSURE: 98 MMHG | WEIGHT: 129 LBS | OXYGEN SATURATION: 96 %

## 2021-07-01 DIAGNOSIS — G43.109 MIGRAINE WITH AURA AND WITHOUT STATUS MIGRAINOSUS, NOT INTRACTABLE: ICD-10-CM

## 2021-07-01 PROCEDURE — 99214 OFFICE O/P EST MOD 30 MIN: CPT | Performed by: PHYSICIAN ASSISTANT

## 2021-07-01 RX ORDER — SUMATRIPTAN 50 MG/1
TABLET, FILM COATED ORAL
Qty: 10 TABLET | Refills: 3 | Status: SHIPPED | OUTPATIENT
Start: 2021-07-01 | End: 2021-09-21 | Stop reason: SDUPTHER

## 2021-07-01 ASSESSMENT — FIBROSIS 4 INDEX: FIB4 SCORE: 0.52

## 2021-07-01 NOTE — PROGRESS NOTES
Chief Complaint   Patient presents with   • Migraine     pt states long hx of migraine       HISTORY OF PRESENT ILLNESS: Tyesha Meyers is an established 25 y.o. female here to discuss the evaluation and management of:    Patient is a pleasant 25-year-old female here today to discuss migraines.  She tells me she was diagnosed with migraines at 16 years old and at that time she was told migraines were more than likely secondary to stress.  She was prescribed a medication to take on a as needed basis when she was symptomatic but is unable to recall the name of the medication.  States she was only getting migraines on average once a month but in the past 1 month she experienced a migraine once a week for 2 weeks consecutively and then twice a week for 2 weeks consecutively.  She admits that her stress levels have increased in the past month.  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   She denies excessive screen time.  Denies excessive caffeine consumption.  Last eye exam was in April 2021 and states it was normal.  Denies sleep deprivation.  On average sleeps 6-7 hours per night denies difficulty falling or staying asleep.  She tells me that she stays hydrated.  Migraines do not occur secondary to hormonal fluctuations.  When she has a migraine she experiences aura, light sensitivity, sound sensitivity, and occasional nausea.   She tried over-the-counter Tylenol and ibuprofen with no alleviation of symptoms.        Patient Active Problem List    Diagnosis Date Noted   • Migraine with aura and without status migrainosus, not intractable 07/01/2021       Allergies:Patient has no known allergies.    Current Outpatient Medications   Medication Sig Dispense Refill   • SUMAtriptan (IMITREX) 50 MG Tab Take 1 tablet by mouth at onset of headache. Repeat dose x1 if symptoms are still present 2 hours later. No more than 200 mg in a 24 hour span. 10 tablet 3   • Cyanocobalamin 1500 MCG TABLET  DISPERSIBLE Take 1 tablet by mouth every day.     • Multiple Vitamin (MULTI-VITAMIN DAILY PO) Take 1 tablet by mouth every day.     • Cholecalciferol (VITAMIN D) 2000 UNIT Tab Take 2,000 Units by mouth every day.       No current facility-administered medications for this visit.       Social History     Tobacco Use   • Smoking status: Never Smoker   • Smokeless tobacco: Never Used   Vaping Use   • Vaping Use: Never used   Substance Use Topics   • Alcohol use: Not Currently   • Drug use: Never       Family Status   Relation Name Status   • Mo  Alive   • Fa  Alive   • Sis  Alive   • Bro  Alive   • Sis  Alive     Family History   Problem Relation Age of Onset   • Hypertension Mother    • No Known Problems Father    • No Known Problems Sister    • No Known Problems Brother    • No Known Problems Sister        ROS:  Review of Systems   Constitutional: Negative for fever, chills, weight loss and malaise/fatigue.   HENT: Negative for ear pain, nosebleeds, congestion, sore throat and neck pain.    Eyes: Negative for blurred vision.   Respiratory: Negative for cough, sputum production, shortness of breath and wheezing.    Cardiovascular: Negative for chest pain, palpitations, orthopnea and leg swelling.   Gastrointestinal: Negative for heartburn, nausea, vomiting and abdominal pain.   Genitourinary: Negative for dysuria, urgency and frequency.   Musculoskeletal: Negative for myalgias, back pain and joint pain.   Skin: Negative for rash and itching.   Neurological: Negative for dizziness, tingling, tremors, sensory change, focal weakness.  Endo/Heme/Allergies: Does not bruise/bleed easily.   Psychiatric/Behavioral: Negative for depression, suicidal ideas and memory loss.  The patient is not nervous/anxious and does not have insomnia.    All other systems reviewed and are negative except as in HPI.    Exam: BP (!) 98/70 (BP Location: Left arm, Patient Position: Sitting, BP Cuff Size: Adult)   Pulse 81   Temp 36.6 °C (97.8  "°F) (Temporal)   Resp 16   Ht 1.6 m (5' 3\")   Wt 58.5 kg (129 lb)   SpO2 96%  Body mass index is 22.85 kg/m².  General: Normal appearing. No distress.  HEENT: Normocephalic. Eyes conjunctiva clear lids without ptosis, pupils equal and reactive to light accommodation, ears normal shape and contour.   Neck: Supple without JVD. Thyroid is not enlarged.  Pulmonary: Clear to ausculation.  Normal effort. No rales, ronchi, or wheezing.  Cardiovascular: Regular rate and rhythm without murmur.  Abdomen: Nondistended.   Neurologic: Grossly nonfocal.  Cranial nerves are normal.  Skin: Warm and dry.  No rashes or suspicious skin lesions.  Musculoskeletal: Normal gait. No extremity cyanosis, clubbing, or edema.  Psych: Normal mood and affect. Alert and oriented x3. Judgment and insight is normal.    Medical decision-making and discussion:  1. Migraine with aura and without status migrainosus, not intractable  Patient has been prescribed Imitrex 50 mg tab advised take 1 tablet by mouth at onset of headache and she can repeat dose x1 if symptoms are still present 2 hours later.  Advised patient to not exceed more than 200 mg in a 24-hour span.  Discussed patient she can titrate medication down to 25 mg or up to 100 mg.  Discussed with patient she will have to find what dosage works best for her.  Discussed side effects of medication with patient.  Advised patient Imitrex can cause grogginess.  Suggested for patient to continue working on diet, exercise, sleep hygiene, hydration, and developing healthy coping mechanisms for stress and anxiety.    - SUMAtriptan (IMITREX) 50 MG Tab; Take 1 tablet by mouth at onset of headache. Repeat dose x1 if symptoms are still present 2 hours later. No more than 200 mg in a 24 hour span.  Dispense: 10 tablet; Refill: 3      Please note that this dictation was created using voice recognition software. I have made every reasonable attempt to correct obvious errors, but I expect that there are " errors of grammar and possibly content that I did not discover before finalizing the note.    Assessment/Plan:  1. Migraine with aura and without status migrainosus, not intractable  SUMAtriptan (IMITREX) 50 MG Tab       Return if symptoms worsen or fail to improve.

## 2021-09-21 DIAGNOSIS — G43.109 MIGRAINE WITH AURA AND WITHOUT STATUS MIGRAINOSUS, NOT INTRACTABLE: ICD-10-CM

## 2021-09-21 RX ORDER — SUMATRIPTAN 50 MG/1
TABLET, FILM COATED ORAL
Qty: 10 TABLET | Refills: 0 | Status: SHIPPED | OUTPATIENT
Start: 2021-09-21

## 2022-02-07 ENCOUNTER — TELEPHONE (OUTPATIENT)
Dept: SCHEDULING | Facility: IMAGING CENTER | Age: 27
End: 2022-02-07

## 2022-02-16 ENCOUNTER — OFFICE VISIT (OUTPATIENT)
Dept: MEDICAL GROUP | Facility: PHYSICIAN GROUP | Age: 27
End: 2022-02-16
Payer: COMMERCIAL

## 2022-02-16 VITALS
HEART RATE: 110 BPM | BODY MASS INDEX: 23.57 KG/M2 | TEMPERATURE: 98.2 F | RESPIRATION RATE: 14 BRPM | SYSTOLIC BLOOD PRESSURE: 116 MMHG | HEIGHT: 63 IN | OXYGEN SATURATION: 97 % | DIASTOLIC BLOOD PRESSURE: 78 MMHG | WEIGHT: 133 LBS

## 2022-02-16 DIAGNOSIS — G43.109 MIGRAINE WITH AURA AND WITHOUT STATUS MIGRAINOSUS, NOT INTRACTABLE: ICD-10-CM

## 2022-02-16 DIAGNOSIS — Z23 NEED FOR VACCINATION: ICD-10-CM

## 2022-02-16 DIAGNOSIS — Z13.220 ENCOUNTER FOR SCREENING FOR LIPID DISORDER: ICD-10-CM

## 2022-02-16 DIAGNOSIS — E78.5 DYSLIPIDEMIA: ICD-10-CM

## 2022-02-16 DIAGNOSIS — E55.9 VITAMIN D DEFICIENCY: ICD-10-CM

## 2022-02-16 DIAGNOSIS — N64.4 PAIN OF RIGHT BREAST: ICD-10-CM

## 2022-02-16 PROCEDURE — 90471 IMMUNIZATION ADMIN: CPT | Performed by: NURSE PRACTITIONER

## 2022-02-16 PROCEDURE — 99214 OFFICE O/P EST MOD 30 MIN: CPT | Mod: 25 | Performed by: NURSE PRACTITIONER

## 2022-02-16 PROCEDURE — 90686 IIV4 VACC NO PRSV 0.5 ML IM: CPT | Performed by: NURSE PRACTITIONER

## 2022-02-16 ASSESSMENT — FIBROSIS 4 INDEX: FIB4 SCORE: 0.54

## 2022-02-16 ASSESSMENT — PATIENT HEALTH QUESTIONNAIRE - PHQ9: CLINICAL INTERPRETATION OF PHQ2 SCORE: 0

## 2022-02-16 NOTE — ASSESSMENT & PLAN NOTE
Pt reports that she has developed intermittent breast pain in her right breast. She reports it as a burning pain that starts at the nipple and radiates to her mid chest. She denies any nipple discharge or skin changes. She denies any trauma to her chest or breast recently or in the past. No family history of breast cancer that the patient knows of.     Will order a breast US to evaluate.

## 2022-02-16 NOTE — ASSESSMENT & PLAN NOTE
This is a chronic stable condition. She reports they have been better. At this time she is only getting her migraines about 1-2 times a month.     Pt reports that the imitrex helps her migraines.     Continue imitrex.

## 2022-02-16 NOTE — PROGRESS NOTES
"  CC: establish care                                                                                                                                  HPI:   Tyesha presents today with the following.    Problem   Pain of Right Breast   Migraine With Aura and Without Status Migrainosus, Not Intractable       Current Outpatient Medications   Medication Sig Dispense Refill   • SUMAtriptan (IMITREX) 50 MG Tab Take 1 tablet by mouth at onset of headache. Repeat dose x1 if symptoms are still present 2 hours later. No more than 200 mg in a 24 hour span. 10 Tablet 0   • Cyanocobalamin 1500 MCG TABLET DISPERSIBLE Take 1 tablet by mouth every day.     • Multiple Vitamin (MULTI-VITAMIN DAILY PO) Take 1 tablet by mouth every day.     • Cholecalciferol (VITAMIN D) 2000 UNIT Tab Take 2,000 Units by mouth every day.       No current facility-administered medications for this visit.       Allergies as of 02/16/2022   • (No Known Allergies)        ROS:  All systems negative expect as addressed in assessment and plan.     /78 (BP Location: Left arm, Patient Position: Sitting, BP Cuff Size: Adult)   Pulse (!) 110   Temp 36.8 °C (98.2 °F) (Temporal)   Resp 14   Ht 1.6 m (5' 3\")   Wt 60.3 kg (133 lb)   SpO2 97%   BMI 23.56 kg/m²     Physical Exam:  Gen:         Alert and oriented, No apparent distress.  Neck:        No Lymphadenopathy.   Lungs:     Clear to auscultation bilaterally. No wheezes, rales, or rhonchi.   CV:          Regular rate and rhythm. No murmurs, rubs or gallops.         Ext:          No clubbing, cyanosis, or peripheral edema.  Skin:  All visible skin intact without lesions.       Assessment and Plan:  26 y.o. female with the following issues.    1. Migraine with aura and without status migrainosus, not intractable     2. Pain of right breast  US-BREAST LIMITED-RIGHT   3. Encounter for screening for lipid disorder  Lipid Profile   4. Vitamin D deficiency  VITAMIN D,25 HYDROXY   5. Dyslipidemia  Lipid " Profile   6. Need for vaccination  Influenza Vaccine Quad Injection (PF)        Migraine with aura and without status migrainosus, not intractable  This is a chronic stable condition. She reports they have been better. At this time she is only getting her migraines about 1-2 times a month.     Pt reports that the imitrex helps her migraines.     Continue imitrex.       Pain of right breast  Pt reports that she has developed intermittent breast pain in her right breast. She reports it as a burning pain that starts at the nipple and radiates to her mid chest. She denies any nipple discharge or skin changes. She denies any trauma to her chest or breast recently or in the past. No family history of breast cancer that the patient knows of.     Will order a breast US to evaluate.     Return for PAP.    I have placed the below orders and discussed them with an approved delegating provider.  The MA is performing the below orders under the direction of Dr. Bustillo.    Please note that this dictation was created using voice recognition software. I have worked with consultants from the vendor as well as technical experts from [facility] to optimize the interface. I have made every reasonable attempt to correct obvious errors, but I expect that there are errors of grammar and possibly content that I did not discover before finalizing the note.

## 2022-02-25 ENCOUNTER — HOSPITAL ENCOUNTER (OUTPATIENT)
Dept: LAB | Facility: MEDICAL CENTER | Age: 27
End: 2022-02-25
Attending: NURSE PRACTITIONER
Payer: COMMERCIAL

## 2022-02-25 DIAGNOSIS — E78.5 DYSLIPIDEMIA: ICD-10-CM

## 2022-02-25 DIAGNOSIS — E55.9 VITAMIN D DEFICIENCY: ICD-10-CM

## 2022-02-25 DIAGNOSIS — Z13.220 ENCOUNTER FOR SCREENING FOR LIPID DISORDER: ICD-10-CM

## 2022-02-25 LAB
25(OH)D3 SERPL-MCNC: 22 NG/ML (ref 30–100)
CHOLEST SERPL-MCNC: 177 MG/DL (ref 100–199)
FASTING STATUS PATIENT QL REPORTED: NORMAL
HDLC SERPL-MCNC: 92 MG/DL
LDLC SERPL CALC-MCNC: 73 MG/DL
TRIGL SERPL-MCNC: 58 MG/DL (ref 0–149)

## 2022-02-25 PROCEDURE — 36415 COLL VENOUS BLD VENIPUNCTURE: CPT

## 2022-02-25 PROCEDURE — 80061 LIPID PANEL: CPT

## 2022-02-25 PROCEDURE — 82306 VITAMIN D 25 HYDROXY: CPT

## 2022-03-02 ENCOUNTER — HOSPITAL ENCOUNTER (OUTPATIENT)
Dept: RADIOLOGY | Facility: MEDICAL CENTER | Age: 27
End: 2022-03-02
Attending: NURSE PRACTITIONER
Payer: COMMERCIAL

## 2022-03-02 DIAGNOSIS — N64.4 PAIN OF RIGHT BREAST: ICD-10-CM

## 2022-03-02 PROCEDURE — 76642 ULTRASOUND BREAST LIMITED: CPT | Mod: RT

## 2022-04-04 ENCOUNTER — APPOINTMENT (OUTPATIENT)
Dept: MEDICAL GROUP | Facility: PHYSICIAN GROUP | Age: 27
End: 2022-04-04
Payer: COMMERCIAL

## 2022-05-17 ENCOUNTER — APPOINTMENT (OUTPATIENT)
Dept: MEDICAL GROUP | Facility: PHYSICIAN GROUP | Age: 27
End: 2022-05-17
Payer: COMMERCIAL

## 2022-05-26 ENCOUNTER — HOSPITAL ENCOUNTER (OUTPATIENT)
Facility: MEDICAL CENTER | Age: 27
End: 2022-05-26
Attending: NURSE PRACTITIONER
Payer: COMMERCIAL

## 2022-05-26 ENCOUNTER — OFFICE VISIT (OUTPATIENT)
Dept: MEDICAL GROUP | Facility: PHYSICIAN GROUP | Age: 27
End: 2022-05-26
Payer: COMMERCIAL

## 2022-05-26 VITALS
OXYGEN SATURATION: 94 % | DIASTOLIC BLOOD PRESSURE: 74 MMHG | WEIGHT: 141.3 LBS | HEART RATE: 89 BPM | HEIGHT: 63 IN | BODY MASS INDEX: 25.04 KG/M2 | SYSTOLIC BLOOD PRESSURE: 108 MMHG | TEMPERATURE: 97.9 F

## 2022-05-26 DIAGNOSIS — Z01.419 ENCOUNTER FOR CERVICAL PAP SMEAR WITH PELVIC EXAM: ICD-10-CM

## 2022-05-26 DIAGNOSIS — Z11.3 SCREEN FOR STD (SEXUALLY TRANSMITTED DISEASE): ICD-10-CM

## 2022-05-26 PROCEDURE — 99395 PREV VISIT EST AGE 18-39: CPT | Performed by: NURSE PRACTITIONER

## 2022-05-26 PROCEDURE — 88175 CYTOPATH C/V AUTO FLUID REDO: CPT

## 2022-05-26 ASSESSMENT — FIBROSIS 4 INDEX: FIB4 SCORE: 0.54

## 2022-05-27 DIAGNOSIS — Z11.3 SCREEN FOR STD (SEXUALLY TRANSMITTED DISEASE): ICD-10-CM

## 2022-05-27 DIAGNOSIS — Z01.419 ENCOUNTER FOR CERVICAL PAP SMEAR WITH PELVIC EXAM: ICD-10-CM

## 2022-05-27 LAB — CYTOLOGY REG CYTOL: NORMAL

## 2022-05-27 NOTE — PROGRESS NOTES
Tyesha Meyers is a 26 y.o. y.o. female who presents for her Gynecologic Exam        [facility] Comments: Pt presents for well woman exam. Patient's last menstrual period was 05/16/2022 (exact date).  .  Review of Systems   Pertinent positives documented in HPI and all other systems reviewed & are negative    All PMH, PSH, allergies, social history and FH reviewed and updated today:  No past medical history on file.  Past Surgical History:   Procedure Laterality Date   • TONSILLECTOMY       Patient has no known allergies.  Social History     Socioeconomic History   • Marital status: Single   Tobacco Use   • Smoking status: Never Smoker   • Smokeless tobacco: Never Used   Vaping Use   • Vaping Use: Never used   Substance and Sexual Activity   • Alcohol use: Not Currently   • Drug use: Never   • Sexual activity: Yes     Partners: Male     Birth control/protection: Condom     Comment: comitted relationship.    Social History Narrative    ** Merged History Encounter **          Family History   Problem Relation Age of Onset   • Hypertension Mother    • No Known Problems Father    • No Known Problems Sister    • No Known Problems Brother    • No Known Problems Sister    • Cancer Maternal Aunt         bone   • Diabetes Maternal Grandmother    • Cancer Maternal Grandfather         prostate   • Diabetes Maternal Grandfather    • Heart Disease Neg Hx      Medications:   Current Outpatient Medications Ordered in Epic   Medication Sig Dispense Refill   • SUMAtriptan (IMITREX) 50 MG Tab Take 1 tablet by mouth at onset of headache. Repeat dose x1 if symptoms are still present 2 hours later. No more than 200 mg in a 24 hour span. 10 Tablet 0   • Cyanocobalamin 1500 MCG TABLET DISPERSIBLE Take 1 tablet by mouth every day.     • Multiple Vitamin (MULTI-VITAMIN DAILY PO) Take 1 tablet by mouth every day.     • Cholecalciferol (VITAMIN D) 2000 UNIT Tab Take 2,000 Units by mouth every day.       No current Epic-ordered  "facility-administered medications on file.          Objective:   Vital measurements:  /74 (BP Location: Right arm, Patient Position: Sitting, BP Cuff Size: Adult)   Pulse 89   Temp 36.6 °C (97.9 °F) (Temporal)   Ht 1.6 m (5' 3\")   Wt 64.1 kg (141 lb 4.8 oz)   SpO2 94%   Body mass index is 25.03 kg/m². (Goal BM I>18 <25)    Physical Exam   Nursing note and vitals reviewed.  Constitutional: She is oriented to person, place, and time. She appears well-developed and well-nourished. No distress.     Genitourinary:  Pelvic exam was performed with patient supine.  External genitalia with no abnormal pigmentation, labial fusion,rash, tenderness, lesion or injury to the labia bilaterally.  Vagina is moist with no lesions, foul discharge, erythema, tenderness or bleeding. No foreign body around the vagina or signs of injury.   Cervix exhibits no motion tenderness, no discharge and no friability.   Uterus is not deviated, not enlarged, not fixed and not tender.  Right adnexum displays no mass, no tenderness and no fullness. Left adnexum displays no mass, no fullness. Pt does reports slight tenderness with palpation.     Musculoskeletal: Normal range of motion. She exhibits no edema and no tenderness.      Neurological: She is alert and oriented to person, place, and time. She exhibits normal muscle tone.     Skin: Skin is warm and dry. No rash noted. She is not diaphoretic. No erythema. No pallor.     Psychiatric: She has a normal mood and affect. Her behavior is normal. Judgment and thought content normal.        Assessment:     1. Screen for STD (sexually transmitted disease)  Chlamydia & N. Gonorrhoeae By PCR   2. Encounter for cervical Pap smear with pelvic exam  THINPREP PAP, REFLEX HPV ON ASC-US AND ABOVE       Plan:   Pap and physical exam performed  Monthly SBE.  Counseling: STD prevention and family planning choices  Encourage exercise and proper diet.  Mammograms starting @ age 40 annually.  See " medications and orders placed in encounter report.

## 2022-06-21 ENCOUNTER — APPOINTMENT (OUTPATIENT)
Dept: MEDICAL GROUP | Facility: PHYSICIAN GROUP | Age: 27
End: 2022-06-21
Payer: COMMERCIAL

## 2022-06-21 NOTE — PROGRESS NOTES
Subjective:     No chief complaint on file.      HPI:   Tyesha presents today with ***. PCP is unavailable, BARON Avalos.    No problem-specific Assessment & Plan notes found for this encounter.      Current Outpatient Medications Ordered in Epic   Medication Sig Dispense Refill   • SUMAtriptan (IMITREX) 50 MG Tab Take 1 tablet by mouth at onset of headache. Repeat dose x1 if symptoms are still present 2 hours later. No more than 200 mg in a 24 hour span. 10 Tablet 0   • Cyanocobalamin 1500 MCG TABLET DISPERSIBLE Take 1 tablet by mouth every day.     • Multiple Vitamin (MULTI-VITAMIN DAILY PO) Take 1 tablet by mouth every day.     • Cholecalciferol (VITAMIN D) 2000 UNIT Tab Take 2,000 Units by mouth every day.       No current [facility]-ordered facility-administered medications on file.       Health Maintenance: {Reviewed with patient.}    ROS:  Gen: no fevers/chills, no changes in weight  Eyes: no changes in vision  ENT: no sore throat, no hearing loss, no bloody nose  Pulm: no sob, no cough  CV: no chest pain, no palpitations  GI: no nausea/vomiting, no diarrhea  : no dysuria  MSk: no myalgias  Skin: no rash  Neuro: no headaches, no numbness/tingling  Heme/Lymph: no easy bruising      Objective:     Exam:  There were no vitals taken for this visit. There is no height or weight on file to calculate BMI.    Physical Exam:  Constitutional: Well-developed and well-nourished. No acute distress.   Skin: Skin is warm and dry. No rash noted.  Head: Atraumatic without lesions.  Eyes: Conjunctivae and extraocular motions are normal. Pupils are equal, round, {and reactive to light}. No scleral icterus.   Ears:  External ears unremarkable. Tympanic membranes clear and intact.  Nose: Nares patent. {Septum midline. Turbinates without erythema nor edema.} No discharge.  Mouth/Throat: Oropharynx is clear and moist. Posterior pharynx without erythema or exudates.  Neck: Supple, trachea midline. Normal range of motion. No  thyromegaly present. No lymphadenopathy--cervical or supraclavicular.  Cardiovascular: Regular rate and rhythm, S1 and S2 without murmur, rubs, or gallops.  Lungs: Normal inspiratory effort, CTA bilaterally, no wheezes/rhonchi/rales  Abdomen: Soft, non tender, and without distention. Active bowel sounds. No rebound, guarding, masses or HSM.  Extremities: No cyanosis, clubbing, erythema, nor edema.  Musculoskeletal: ***  Neurological: Alert and oriented x 3. No gross/focal deficits.  Psychiatric:  Behavior, mood, and affect are appropriate.    Labs: Reviewed from ***  Imaging: ***    Assessment & Plan:     26 y.o. female with the following -     There are no diagnoses linked to this encounter.    I spent a total of *** minutes with record review, exam, communication with the patient, communication with other providers, and documentation of this encounter.      No follow-ups on file.    Please note that this dictation was created using voice recognition software. I have made every reasonable attempt to correct obvious errors, but I expect that there are errors of grammar and possibly content that I did not discover before finalizing the note.

## 2022-06-23 ENCOUNTER — OFFICE VISIT (OUTPATIENT)
Dept: MEDICAL GROUP | Facility: PHYSICIAN GROUP | Age: 27
End: 2022-06-23
Payer: COMMERCIAL

## 2022-06-23 VITALS
SYSTOLIC BLOOD PRESSURE: 106 MMHG | BODY MASS INDEX: 25.03 KG/M2 | TEMPERATURE: 97.8 F | DIASTOLIC BLOOD PRESSURE: 68 MMHG | OXYGEN SATURATION: 96 % | HEART RATE: 85 BPM | HEIGHT: 63 IN

## 2022-06-23 DIAGNOSIS — R20.2 NUMBNESS AND TINGLING: ICD-10-CM

## 2022-06-23 DIAGNOSIS — R20.0 NUMBNESS AND TINGLING: ICD-10-CM

## 2022-06-23 DIAGNOSIS — G43.109 MIGRAINE WITH AURA AND WITHOUT STATUS MIGRAINOSUS, NOT INTRACTABLE: ICD-10-CM

## 2022-06-23 DIAGNOSIS — R42 DIZZINESS: ICD-10-CM

## 2022-06-23 PROCEDURE — 99213 OFFICE O/P EST LOW 20 MIN: CPT

## 2022-06-23 NOTE — ASSESSMENT & PLAN NOTE
This is a new condition for examiner.   Patient states for about 6 months, she has experienced random numbness and tingling to her bilateral lower extremities.  Patient states she notices it more when she is sitting in one spot for too long.    Patient states over the last couple of months she has started to notice numbness and tingling to her bilateral upper extremities, mostly when she is laying down.  Patient does also endorse weakness in her bilateral hands.  She explains its difficult to grab items and to hold onto them.  She states this happens when she is experiencing the numbness and tingling but it also occurs at random times throughout the day.      Patient also endorses having unusual fatigue and dizziness out of nowhere.  Patient states the dizziness will occur when she is sitting still and not moving her head.  She states it only lasts a couple minutes and then resolves.  She believes she is remaining hydrated.  She denies heart palpitations.  Her TSH has recently been checked and it was normal.  She does not have anemia evident on recent labs.    Patient does have a history of migraines, however, patient states she has not had any migraines recently and there is no correlation between her current symptoms with migraines.  She denies experiencing numbness and tingling in the past with her migraines.    Considering her numbness and tingling is positional, I suspect it could be due to a nerve impingement.  However, due to dizziness and weakness, I will order an MRI to rule out any demyelinating conditions.  She denies muscle aches, she denies trauma to her back or any injuries.  She does not have any pain, joint pain or stiffness.  She will follow-up in 6 weeks.

## 2022-06-23 NOTE — ASSESSMENT & PLAN NOTE
Patient endorses having unusual fatigue and dizziness out of nowhere.  Patient states the dizziness will occur when she is sitting still and not moving her head.  She states it only lasts a couple minutes and then resolves.  She believes she is remaining hydrated.  She denies heart palpitations.  Her TSH has recently been checked and it was normal.  She does not have anemia evident on recent labs.

## 2022-06-23 NOTE — PROGRESS NOTES
CC:   Chief Complaint   Patient presents with   • Establish Care                                                                                                                        HPI:   Tyesha is a pleasant 26 y.o. female who presents today with the following.    Numbness and tingling, weakness, dizziness  Patient states for about 6 months, she has experienced random numbness and tingling to her bilateral lower extremities.  Patient states she notices it more when she is sitting in one spot for too long.    Patient states over the last couple of months she has started to notice numbness and tingling to her bilateral upper extremities, mostly when she is laying down.  Patient does also endorse weakness in her bilateral hands.  She explains its difficult to grab items and to hold onto them.  She states this happens when she is experiencing the numbness and tingling but it also occurs at random times throughout the day.      Patient also endorses having unusual fatigue and dizziness out of nowhere.  Patient states the dizziness will occur when she is sitting still and not moving her head.  She states it only lasts a couple minutes and then resolves.  She believes she is remaining hydrated.  She denies heart palpitations.  Her TSH has recently been checked and it was normal.  She does not have anemia evident on recent labs.    Patient does have a history of migraines, however, patient states she has not had any migraines recently and there is no correlation between her current symptoms with migraines.  She denies experiencing numbness and tingling in the past with her migraines.      Patient Active Problem List   Diagnosis   • Migraine with aura and without status migrainosus, not intractable   • Pain of right breast   • Numbness and tingling   • Dizziness       Current Outpatient Medications   Medication Sig Dispense Refill   • SUMAtriptan (IMITREX) 50 MG Tab Take 1 tablet by mouth at onset of headache. Repeat  "dose x1 if symptoms are still present 2 hours later. No more than 200 mg in a 24 hour span. 10 Tablet 0   • Cyanocobalamin 1500 MCG TABLET DISPERSIBLE Take 1 tablet by mouth every day.     • Multiple Vitamin (MULTI-VITAMIN DAILY PO) Take 1 tablet by mouth every day.     • Cholecalciferol (VITAMIN D) 2000 UNIT Tab Take 2,000 Units by mouth every day.       No current facility-administered medications for this visit.       Allergies as of 06/23/2022   • (No Known Allergies)        Labs: Reviewed from 3/29/2021.    ROS:  All systems negative expect as addressed in assessment and plan.     /68 (BP Location: Right arm, Patient Position: Sitting, BP Cuff Size: Adult)   Pulse 85   Temp 36.6 °C (97.8 °F) (Temporal)   Ht 1.6 m (5' 3\")   SpO2 96%   BMI 25.03 kg/m²     Physical Exam:  Gen:         Alert and oriented, No apparent distress.  Neck:        No Lymphadenopathy.   Lungs:     Clear to auscultation bilaterally. No wheezes, rales, or rhonchi.   CV:          Regular rate and rhythm. No murmurs, rubs or gallops.         Ext:          No clubbing, cyanosis, or peripheral edema.  Skin:  All visible skin intact without lesions.       Assessment and Plan:  26 y.o. female with the following issues.    Assessment/Plan:    1. Numbness and tingling  2. Dizziness  3. Migraine with aura and without status migrainosus, not intractable    Considering her numbness and tingling is positional, I suspect it could be due to a nerve impingement.  However, due to dizziness and weakness, I will order an MRI to rule out any demyelinating conditions.  She denies muscle aches, she denies trauma to her back or any injuries.  She does not have any pain, joint pain or stiffness. She will follow-up in 6 weeks.    - MR-BRAIN-WITH & W/O; Future  - MR-CERVICAL SPINE-WITH & W/O; Future  - MR-THORACIC SPINE-W/O; Future      Patient educated in proper administration of medication(s) ordered today including safety, possible SE, risks, benefits, " rationale and alternatives to therapy.   Supportive care, differential diagnoses, and indications for immediate follow-up discussed with patient.    Pathogenesis of diagnosis discussed including typical length and natural progression.    Instructed to return to clinic or nearest emergency department for any change in condition, further concerns, or worsening of symptoms.  Patient states understanding of the plan of care and discharge instructions.    Return in about 6 weeks (around 8/4/2022).    I spent a total of 22 minutes with record review, exam, and communication with the patient, communication with other providers, and documentation of this encounter. This does not include time spent on separately billable procedures/tests.    I have placed the below orders and discussed them with an approved delegating provider.  The MA is performing the below orders under the direction of Dr. Phillips.    Please note that this dictation was created using voice recognition software. I have worked with consultants from the vendor as well as technical experts from [facility] to optimize the interface. I have made every reasonable attempt to correct obvious errors, but I expect that there are errors of grammar and possibly content that I did not discover before finalizing the note.

## 2023-09-15 ENCOUNTER — OFFICE VISIT (OUTPATIENT)
Dept: URGENT CARE | Facility: PHYSICIAN GROUP | Age: 28
End: 2023-09-15
Payer: COMMERCIAL

## 2023-09-15 VITALS
TEMPERATURE: 98.3 F | WEIGHT: 141 LBS | BODY MASS INDEX: 24.98 KG/M2 | OXYGEN SATURATION: 99 % | RESPIRATION RATE: 14 BRPM | HEART RATE: 72 BPM | SYSTOLIC BLOOD PRESSURE: 102 MMHG | DIASTOLIC BLOOD PRESSURE: 64 MMHG | HEIGHT: 63 IN

## 2023-09-15 DIAGNOSIS — H92.01 OTALGIA, RIGHT: ICD-10-CM

## 2023-09-15 DIAGNOSIS — H69.91 ETD (EUSTACHIAN TUBE DYSFUNCTION), RIGHT: ICD-10-CM

## 2023-09-15 PROCEDURE — 3078F DIAST BP <80 MM HG: CPT | Performed by: NURSE PRACTITIONER

## 2023-09-15 PROCEDURE — 3074F SYST BP LT 130 MM HG: CPT | Performed by: NURSE PRACTITIONER

## 2023-09-15 PROCEDURE — 99213 OFFICE O/P EST LOW 20 MIN: CPT | Performed by: NURSE PRACTITIONER

## 2023-09-15 RX ORDER — METHYLPREDNISOLONE 4 MG/1
TABLET ORAL
Qty: 1 EACH | Refills: 0 | Status: SHIPPED | OUTPATIENT
Start: 2023-09-15

## 2023-09-15 ASSESSMENT — ENCOUNTER SYMPTOMS
RESPIRATORY NEGATIVE: 1
CONSTITUTIONAL NEGATIVE: 1
NEUROLOGICAL NEGATIVE: 1
CHILLS: 0
FEVER: 0

## 2023-09-15 ASSESSMENT — VISUAL ACUITY: OU: 1

## 2023-09-15 NOTE — PROGRESS NOTES
Subjective:     Tyesha Meyers is a 27 y.o. female who presents for Otalgia (Right ear x 1 week )       Otalgia   There is pain in the right ear. This is a new problem. The problem has been gradually worsening.     CC of 1 week of right ear pain. 7/10, constant, pressure. Had symptoms at the left ear which quickly resolved.    Review of Systems   Constitutional: Negative.  Negative for chills, fever and malaise/fatigue.   HENT:  Positive for ear pain. Negative for congestion.    Respiratory: Negative.     Neurological: Negative.    Endo/Heme/Allergies:  Negative for environmental allergies.   All other systems reviewed and are negative.    Refer to HPI for additional details.    During this visit, appropriate PPE was worn, and hand hygiene was performed.    PMH:  has no past medical history on file.    MEDS:   Current Outpatient Medications:     methylPREDNISolone (MEDROL DOSEPAK) 4 MG Tablet Therapy Pack, Use as directed on package. May take all of Day 1 as a single dose (24 mg) when starting., Disp: 1 Each, Rfl: 0    SUMAtriptan (IMITREX) 50 MG Tab, Take 1 tablet by mouth at onset of headache. Repeat dose x1 if symptoms are still present 2 hours later. No more than 200 mg in a 24 hour span., Disp: 10 Tablet, Rfl: 0    Cyanocobalamin 1500 MCG TABLET DISPERSIBLE, Take 1 tablet by mouth every day., Disp: , Rfl:     Multiple Vitamin (MULTI-VITAMIN DAILY PO), Take 1 tablet by mouth every day., Disp: , Rfl:     Cholecalciferol (VITAMIN D) 2000 UNIT Tab, Take 2,000 Units by mouth every day., Disp: , Rfl:     ALLERGIES: No Known Allergies  SURGHX:   Past Surgical History:   Procedure Laterality Date    TONSILLECTOMY       SOCHX:  reports that she has never smoked. She has never used smokeless tobacco. She reports that she does not currently use alcohol. She reports that she does not use drugs.    FH: Per HPI as applicable/pertinent.      Objective:     /64   Pulse 72   Temp 36.8 °C (98.3 °F) (Temporal)    "Resp 14   Ht 1.6 m (5' 3\")   Wt 64 kg (141 lb)   SpO2 99%   BMI 24.98 kg/m²     Physical Exam  Nursing note reviewed.   Constitutional:       General: She is not in acute distress.     Appearance: She is well-developed. She is not ill-appearing or toxic-appearing.   HENT:      Right Ear: Ear canal and external ear normal. No tenderness. Tympanic membrane is not perforated, erythematous or bulging (Dull).      Left Ear: Ear canal and external ear normal. Tympanic membrane is scarred. Tympanic membrane is not perforated, erythematous or bulging.   Eyes:      General: Vision grossly intact.   Cardiovascular:      Rate and Rhythm: Normal rate.   Pulmonary:      Effort: Pulmonary effort is normal. No respiratory distress.   Musculoskeletal:         General: No deformity. Normal range of motion.   Skin:     General: Skin is warm and dry.      Coloration: Skin is not pale.   Neurological:      Mental Status: She is alert and oriented to person, place, and time.      Motor: No weakness.   Psychiatric:         Behavior: Behavior normal. Behavior is cooperative.       Assessment/Plan:     1. Otalgia, right  - methylPREDNISolone (MEDROL DOSEPAK) 4 MG Tablet Therapy Pack; Use as directed on package. May take all of Day 1 as a single dose (24 mg) when starting.  Dispense: 1 Each; Refill: 0    2. ETD (Eustachian tube dysfunction), right  - methylPREDNISolone (MEDROL DOSEPAK) 4 MG Tablet Therapy Pack; Use as directed on package. May take all of Day 1 as a single dose (24 mg) when starting.  Dispense: 1 Each; Refill: 0    Vital signs stable, afebrile, no acute distress at this time. No signs or symptoms of acute bacterial process requiring antibiotics at this time.     Rx as above sent electronically for symptom relief from likely ETD. Causes discussed.    Monitor. Warning signs reviewed. Return precautions advised.     Discharge summary provided.     Differential diagnosis, natural history, supportive care, over-the-counter " symptom management per 's instructions, close monitoring, and indications for immediate follow-up discussed.     All questions answered. Patient agrees with the plan of care.

## 2024-04-03 ENCOUNTER — APPOINTMENT (OUTPATIENT)
Dept: MEDICAL GROUP | Facility: PHYSICIAN GROUP | Age: 29
End: 2024-04-03
Payer: COMMERCIAL

## 2024-04-03 ENCOUNTER — RESEARCH ENCOUNTER (OUTPATIENT)
Dept: RESEARCH | Facility: MEDICAL CENTER | Age: 29
End: 2024-04-03

## 2024-04-03 VITALS
DIASTOLIC BLOOD PRESSURE: 70 MMHG | BODY MASS INDEX: 25.69 KG/M2 | TEMPERATURE: 97.9 F | WEIGHT: 145 LBS | OXYGEN SATURATION: 96 % | HEIGHT: 63 IN | HEART RATE: 71 BPM | RESPIRATION RATE: 14 BRPM | SYSTOLIC BLOOD PRESSURE: 100 MMHG

## 2024-04-03 DIAGNOSIS — Z83.3 FAMILY HISTORY OF DIABETES MELLITUS: ICD-10-CM

## 2024-04-03 DIAGNOSIS — N92.1 MENORRHAGIA WITH IRREGULAR CYCLE: ICD-10-CM

## 2024-04-03 DIAGNOSIS — Z00.00 WELL ADULT EXAM: Primary | ICD-10-CM

## 2024-04-03 DIAGNOSIS — Z13.1 ENCOUNTER FOR SCREENING FOR DIABETES MELLITUS: ICD-10-CM

## 2024-04-03 DIAGNOSIS — Z11.4 ENCOUNTER FOR SCREENING FOR HIV: ICD-10-CM

## 2024-04-03 DIAGNOSIS — Z80.0 FAMILY HISTORY OF COLON CANCER: ICD-10-CM

## 2024-04-03 DIAGNOSIS — Z11.59 ENCOUNTER FOR HEPATITIS C VIRUS SCREENING TEST FOR HIGH RISK PATIENT: ICD-10-CM

## 2024-04-03 DIAGNOSIS — Z13.79 GENETIC SCREENING: ICD-10-CM

## 2024-04-03 DIAGNOSIS — Z11.3 SCREEN FOR STD (SEXUALLY TRANSMITTED DISEASE): ICD-10-CM

## 2024-04-03 DIAGNOSIS — Z13.220 ENCOUNTER FOR SCREENING FOR LIPID DISORDER: ICD-10-CM

## 2024-04-03 DIAGNOSIS — Z00.6 RESEARCH STUDY PATIENT: ICD-10-CM

## 2024-04-03 DIAGNOSIS — Z91.89 ENCOUNTER FOR HEPATITIS C VIRUS SCREENING TEST FOR HIGH RISK PATIENT: ICD-10-CM

## 2024-04-03 DIAGNOSIS — G43.109 MIGRAINE WITH AURA AND WITHOUT STATUS MIGRAINOSUS, NOT INTRACTABLE: ICD-10-CM

## 2024-04-03 DIAGNOSIS — Z13.29 SCREENING FOR THYROID DISORDER: ICD-10-CM

## 2024-04-03 DIAGNOSIS — Z23 NEED FOR VACCINATION: ICD-10-CM

## 2024-04-03 PROBLEM — R42 DIZZINESS: Status: RESOLVED | Noted: 2022-06-23 | Resolved: 2024-04-03

## 2024-04-03 PROBLEM — R20.2 NUMBNESS AND TINGLING: Status: RESOLVED | Noted: 2022-06-23 | Resolved: 2024-04-03

## 2024-04-03 PROBLEM — R20.0 NUMBNESS AND TINGLING: Status: RESOLVED | Noted: 2022-06-23 | Resolved: 2024-04-03

## 2024-04-03 PROCEDURE — 90471 IMMUNIZATION ADMIN: CPT | Performed by: NURSE PRACTITIONER

## 2024-04-03 PROCEDURE — 90686 IIV4 VACC NO PRSV 0.5 ML IM: CPT | Performed by: NURSE PRACTITIONER

## 2024-04-03 PROCEDURE — 3078F DIAST BP <80 MM HG: CPT | Performed by: NURSE PRACTITIONER

## 2024-04-03 PROCEDURE — 99395 PREV VISIT EST AGE 18-39: CPT | Mod: 25 | Performed by: NURSE PRACTITIONER

## 2024-04-03 PROCEDURE — 3074F SYST BP LT 130 MM HG: CPT | Performed by: NURSE PRACTITIONER

## 2024-04-03 RX ORDER — FERROUS SULFATE 325(65) MG
325 TABLET ORAL
COMMUNITY

## 2024-04-03 RX ORDER — MULTIVIT WITH MINERALS/LUTEIN
1 TABLET ORAL DAILY
COMMUNITY

## 2024-04-03 RX ORDER — SUMATRIPTAN 50 MG/1
TABLET, FILM COATED ORAL
Qty: 9 TABLET | Refills: 5 | Status: SHIPPED | OUTPATIENT
Start: 2024-04-03

## 2024-04-03 RX ORDER — ONDANSETRON 4 MG/1
4 TABLET, ORALLY DISINTEGRATING ORAL EVERY 6 HOURS PRN
Qty: 10 TABLET | Refills: 5 | Status: SHIPPED | OUTPATIENT
Start: 2024-04-03 | End: 2024-05-03

## 2024-04-03 ASSESSMENT — PATIENT HEALTH QUESTIONNAIRE - PHQ9: CLINICAL INTERPRETATION OF PHQ2 SCORE: 0

## 2024-04-03 NOTE — RESEARCH NOTE
Patient has been referred by ANA Lopez. Sent initial referral follow-up message with instructions to locate and sign consent form(s). The following consent form(s) have been pushed to the patient's MyChart: [facility]E

## 2024-04-03 NOTE — RESEARCH NOTE
Confirmed with the participant which designated provider ([email or web address] [email or web address]) they would like study results shared with. Patient will have an opportunity to share the results with any providers of their choosing in the future by accessing their results from [facility].

## 2024-04-03 NOTE — PROGRESS NOTES
Subjective:     CC:   Chief Complaint   Patient presents with    Annual Exam       HPI:   Tyesha Meyers is a 28 y.o. female who presents for annual exam    Patient has GYN provider: No   Last Pap Smear: 6/2022   H/O Abnormal Pap: No  Last Mammogram: Will start screening at age 42. No family history of breast Cx.   Last Bone Density Test: N/A,   Last Colorectal Cancer Screening: pt with family history of colon cancer on mom's side.   Last Tdap: 2019  Received HPV series: Yes    Patient's last menstrual period was 03/04/2024 (exact date). Reports irregular periods with her cycles.   Hx STDs: No  Birth control: None  Menses every month with 7 days with moderate, heavy, and clotting bleeding.  Reports moderate cramping and does take OTC analgesics for cramps.      Exercise: strenuous regular exercise, aerobic > 3 hours a week      OB History   No obstetric history on file.      She  reports being sexually active and has had partner(s) who are male. She reports using the following method of birth control/protection: Condom.    She  has no past medical history on file.  She  has a past surgical history that includes tonsillectomy.    Family History   Problem Relation Age of Onset    Hypertension Mother     No Known Problems Father     No Known Problems Sister     No Known Problems Brother     No Known Problems Sister     Cancer Maternal Aunt         bone    Diabetes Maternal Grandmother     Cancer Maternal Grandfather         prostate    Diabetes Maternal Grandfather     Heart Disease Neg Hx      Social History     Tobacco Use    Smoking status: Never    Smokeless tobacco: Never   Vaping Use    Vaping Use: Never used   Substance Use Topics    Alcohol use: Not Currently    Drug use: Never       Patient Active Problem List    Diagnosis Date Noted    Pain of right breast 02/16/2022    Migraine with aura and without status migrainosus, not intractable 07/01/2021     Current Outpatient Medications   Medication Sig  "Dispense Refill    SUMAtriptan (IMITREX) 50 MG Tab Take 1 tablet by mouth at onset of headache. Repeat dose x1 if symptoms are still present 2 hours later. No more than 200 mg in a 24 hour span. 9 Tablet 5    ondansetron (ZOFRAN ODT) 4 MG TABLET DISPERSIBLE Take 1 Tablet by mouth every 6 hours as needed for Nausea/Vomiting (migraine) for up to 30 days. 10 Tablet 5    Ascorbic Acid (VITAMIN C) 1000 MG Tab Take 1 Tablet by mouth every day.      ferrous sulfate 325 (65 Fe) MG tablet Take 325 mg by mouth 3 times a week.      Multiple Vitamin (MULTI-VITAMIN DAILY PO) Take 1 tablet by mouth every day.      Cholecalciferol (VITAMIN D) 2000 UNIT Tab Take 2,000 Units by mouth every day.       No current facility-administered medications for this visit.     No Known Allergies      Objective:   /70 (BP Location: Left arm, Patient Position: Sitting)   Pulse 71   Temp 36.6 °C (97.9 °F) (Temporal)   Resp 14   Ht 1.6 m (5' 3\")   Wt 65.8 kg (145 lb)   LMP 03/04/2024 (Exact Date)   SpO2 96%   BMI 25.69 kg/m²     Wt Readings from Last 4 Encounters:   04/03/24 65.8 kg (145 lb)   09/15/23 64 kg (141 lb)   05/26/22 64.1 kg (141 lb 4.8 oz)   02/16/22 60.3 kg (133 lb)         Physical Exam  Vitals reviewed.   Constitutional:       Appearance: Normal appearance.   HENT:      Head: Normocephalic and atraumatic.      Mouth/Throat:      Mouth: Mucous membranes are moist.   Eyes:      Extraocular Movements: Extraocular movements intact.      Conjunctiva/sclera: Conjunctivae normal.   Neck:      Thyroid: No thyroid mass or thyromegaly.   Cardiovascular:      Rate and Rhythm: Normal rate and regular rhythm.      Pulses: Normal pulses.      Heart sounds: Normal heart sounds.   Pulmonary:      Effort: Pulmonary effort is normal.      Breath sounds: Normal breath sounds.   Abdominal:      General: Abdomen is flat.   Musculoskeletal:         General: Normal range of motion.      Cervical back: Normal range of motion and neck supple. "   Skin:     General: Skin is warm and dry.   Neurological:      General: No focal deficit present.      Mental Status: She is alert and oriented to person, place, and time.   Psychiatric:         Mood and Affect: Mood normal.         Behavior: Behavior normal.         Thought Content: Thought content normal.         Assessment and Plan:     1. Well adult exam  - Comp Metabolic Panel; Future  - Lipid Profile; Future  - FREE THYROXINE; Future  - TSH; Future  - VITAMIN D,25 HYDROXY (DEFICIENCY); Future  - HEMOGLOBIN A1C; Future  - CBC WITHOUT DIFFERENTIAL; Future    2. Migraine with aura and without status migrainosus, not intractable  - SUMAtriptan (IMITREX) 50 MG Tab; Take 1 tablet by mouth at onset of headache. Repeat dose x1 if symptoms are still present 2 hours later. No more than 200 mg in a 24 hour span.  Dispense: 9 Tablet; Refill: 5  - Comp Metabolic Panel; Future  - FREE THYROXINE; Future  - TSH; Future  - HEMOGLOBIN A1C; Future  - CBC WITHOUT DIFFERENTIAL; Future    3. Screening for thyroid disorder  - FREE THYROXINE; Future  - TSH; Future    4. Encounter for screening for diabetes mellitus  - Comp Metabolic Panel; Future  - HEMOGLOBIN A1C; Future    5. Encounter for screening for lipid disorder  - Lipid Profile; Future    6. Encounter for screening for HIV  - HIV AG/AB COMBO ASSAY SCREENING; Future    7. Encounter for hepatitis C virus screening test for high risk patient  - HEP C VIRUS ANTIBODY; Future    8. Family history of diabetes mellitus  - Comp Metabolic Panel; Future  - HEMOGLOBIN A1C; Future    9. Screen for STD (sexually transmitted disease)  - T.Pallidum AB MICHAEL (Screening); Future  - Chlamydia/GC, PCR (Urine); Future    10. Genetic screening  - Referral to Genetic Research Studies    11. Family history of colon cancer  - Referral to Genetic Research Studies    12. Menorrhagia with irregular cycle  - IRON/TOTAL IRON BIND; Future  - FERRITIN; Future  - VITAMIN B12; Future    13. Need for  vaccination  - Influenza Vaccine Quad Injection (PF)    Other orders  - ondansetron (ZOFRAN ODT) 4 MG TABLET DISPERSIBLE; Take 1 Tablet by mouth every 6 hours as needed for Nausea/Vomiting (migraine) for up to 30 days.  Dispense: 10 Tablet; Refill: 5  - Ascorbic Acid (VITAMIN C) 1000 MG Tab; Take 1 Tablet by mouth every day.  - ferrous sulfate 325 (65 Fe) MG tablet; Take 325 mg by mouth 3 times a week.      Health maintenance: Age-appropriate anticipatory guidance discussed  Labs and imaging per orders  Immunizations per orders  Patient counseled about skin care, diet, supplements, and exercise.  Discussed  breast self exam, mammography screening, STD prevention, HIV risk factors and prevention, feminine hygiene, osteoporosis, adequate intake of calcium and vitamin D, diet and exercise, colorectal cancer screening     Follow-up: Return for as needed or yearly.

## 2024-04-19 ENCOUNTER — HOSPITAL ENCOUNTER (OUTPATIENT)
Dept: LAB | Facility: MEDICAL CENTER | Age: 29
End: 2024-04-19
Attending: NURSE PRACTITIONER
Payer: COMMERCIAL

## 2024-04-19 DIAGNOSIS — Z91.89 ENCOUNTER FOR HEPATITIS C VIRUS SCREENING TEST FOR HIGH RISK PATIENT: ICD-10-CM

## 2024-04-19 DIAGNOSIS — Z13.1 ENCOUNTER FOR SCREENING FOR DIABETES MELLITUS: ICD-10-CM

## 2024-04-19 DIAGNOSIS — Z13.29 SCREENING FOR THYROID DISORDER: ICD-10-CM

## 2024-04-19 DIAGNOSIS — Z00.00 WELL ADULT EXAM: ICD-10-CM

## 2024-04-19 DIAGNOSIS — Z13.220 ENCOUNTER FOR SCREENING FOR LIPID DISORDER: ICD-10-CM

## 2024-04-19 DIAGNOSIS — N92.1 MENORRHAGIA WITH IRREGULAR CYCLE: ICD-10-CM

## 2024-04-19 DIAGNOSIS — G43.109 MIGRAINE WITH AURA AND WITHOUT STATUS MIGRAINOSUS, NOT INTRACTABLE: ICD-10-CM

## 2024-04-19 DIAGNOSIS — Z11.3 SCREEN FOR STD (SEXUALLY TRANSMITTED DISEASE): ICD-10-CM

## 2024-04-19 DIAGNOSIS — Z11.59 ENCOUNTER FOR HEPATITIS C VIRUS SCREENING TEST FOR HIGH RISK PATIENT: ICD-10-CM

## 2024-04-19 DIAGNOSIS — Z83.3 FAMILY HISTORY OF DIABETES MELLITUS: ICD-10-CM

## 2024-04-19 DIAGNOSIS — Z11.4 ENCOUNTER FOR SCREENING FOR HIV: ICD-10-CM

## 2024-04-19 LAB
25(OH)D3 SERPL-MCNC: 23 NG/ML (ref 30–100)
ALBUMIN SERPL BCP-MCNC: 4.4 G/DL (ref 3.2–4.9)
ALBUMIN/GLOB SERPL: 1.6 G/DL
ALP SERPL-CCNC: 55 U/L (ref 30–99)
ALT SERPL-CCNC: 12 U/L (ref 2–50)
ANION GAP SERPL CALC-SCNC: 13 MMOL/L (ref 7–16)
AST SERPL-CCNC: 19 U/L (ref 12–45)
BILIRUB SERPL-MCNC: 0.7 MG/DL (ref 0.1–1.5)
BUN SERPL-MCNC: 14 MG/DL (ref 8–22)
CALCIUM ALBUM COR SERPL-MCNC: 8.8 MG/DL (ref 8.5–10.5)
CALCIUM SERPL-MCNC: 9.1 MG/DL (ref 8.5–10.5)
CHLORIDE SERPL-SCNC: 104 MMOL/L (ref 96–112)
CHOLEST SERPL-MCNC: 159 MG/DL (ref 100–199)
CO2 SERPL-SCNC: 23 MMOL/L (ref 20–33)
CREAT SERPL-MCNC: 0.76 MG/DL (ref 0.5–1.4)
ERYTHROCYTE [DISTWIDTH] IN BLOOD BY AUTOMATED COUNT: 43.9 FL (ref 35.9–50)
EST. AVERAGE GLUCOSE BLD GHB EST-MCNC: 100 MG/DL
FERRITIN SERPL-MCNC: 63.3 NG/ML (ref 10–291)
GFR SERPLBLD CREATININE-BSD FMLA CKD-EPI: 111 ML/MIN/1.73 M 2
GLOBULIN SER CALC-MCNC: 2.8 G/DL (ref 1.9–3.5)
GLUCOSE SERPL-MCNC: 81 MG/DL (ref 65–99)
HBA1C MFR BLD: 5.1 % (ref 4–5.6)
HCT VFR BLD AUTO: 44.1 % (ref 37–47)
HCV AB SER QL: NORMAL
HDLC SERPL-MCNC: 69 MG/DL
HGB BLD-MCNC: 14.7 G/DL (ref 12–16)
HIV 1+2 AB+HIV1 P24 AG SERPL QL IA: NORMAL
IRON SATN MFR SERPL: 60 % (ref 15–55)
IRON SERPL-MCNC: 185 UG/DL (ref 40–170)
LDLC SERPL CALC-MCNC: 80 MG/DL
MCH RBC QN AUTO: 31.3 PG (ref 27–33)
MCHC RBC AUTO-ENTMCNC: 33.3 G/DL (ref 32.2–35.5)
MCV RBC AUTO: 93.8 FL (ref 81.4–97.8)
PLATELET # BLD AUTO: 281 K/UL (ref 164–446)
PMV BLD AUTO: 10.3 FL (ref 9–12.9)
POTASSIUM SERPL-SCNC: 4.5 MMOL/L (ref 3.6–5.5)
PROT SERPL-MCNC: 7.2 G/DL (ref 6–8.2)
RBC # BLD AUTO: 4.7 M/UL (ref 4.2–5.4)
SODIUM SERPL-SCNC: 140 MMOL/L (ref 135–145)
T PALLIDUM AB SER QL IA: NORMAL
T4 FREE SERPL-MCNC: 1.44 NG/DL (ref 0.93–1.7)
TIBC SERPL-MCNC: 310 UG/DL (ref 250–450)
TRIGL SERPL-MCNC: 51 MG/DL (ref 0–149)
TSH SERPL DL<=0.005 MIU/L-ACNC: 0.49 UIU/ML (ref 0.38–5.33)
UIBC SERPL-MCNC: 125 UG/DL (ref 110–370)
VIT B12 SERPL-MCNC: 568 PG/ML (ref 211–911)
WBC # BLD AUTO: 6.1 K/UL (ref 4.8–10.8)

## 2024-04-19 PROCEDURE — 80053 COMPREHEN METABOLIC PANEL: CPT

## 2024-04-19 PROCEDURE — 84443 ASSAY THYROID STIM HORMONE: CPT

## 2024-04-19 PROCEDURE — 86803 HEPATITIS C AB TEST: CPT

## 2024-04-19 PROCEDURE — 85027 COMPLETE CBC AUTOMATED: CPT

## 2024-04-19 PROCEDURE — 86780 TREPONEMA PALLIDUM: CPT

## 2024-04-19 PROCEDURE — 36415 COLL VENOUS BLD VENIPUNCTURE: CPT

## 2024-04-19 PROCEDURE — 82306 VITAMIN D 25 HYDROXY: CPT

## 2024-04-19 PROCEDURE — 87389 HIV-1 AG W/HIV-1&-2 AB AG IA: CPT

## 2024-04-19 PROCEDURE — 83550 IRON BINDING TEST: CPT

## 2024-04-19 PROCEDURE — 80061 LIPID PANEL: CPT

## 2024-04-19 PROCEDURE — 83540 ASSAY OF IRON: CPT

## 2024-04-19 PROCEDURE — 87491 CHLMYD TRACH DNA AMP PROBE: CPT

## 2024-04-19 PROCEDURE — 82607 VITAMIN B-12: CPT

## 2024-04-19 PROCEDURE — 83036 HEMOGLOBIN GLYCOSYLATED A1C: CPT

## 2024-04-19 PROCEDURE — 82728 ASSAY OF FERRITIN: CPT

## 2024-04-19 PROCEDURE — 84439 ASSAY OF FREE THYROXINE: CPT

## 2024-04-19 PROCEDURE — 87591 N.GONORRHOEAE DNA AMP PROB: CPT

## 2024-04-20 LAB
C TRACH DNA SPEC QL NAA+PROBE: NEGATIVE
N GONORRHOEA DNA SPEC QL NAA+PROBE: NEGATIVE
SPECIMEN SOURCE: NORMAL

## 2024-04-22 ENCOUNTER — HOSPITAL ENCOUNTER (OUTPATIENT)
Dept: LAB | Facility: MEDICAL CENTER | Age: 29
End: 2024-04-22
Attending: NURSE PRACTITIONER
Payer: COMMERCIAL

## 2024-04-22 DIAGNOSIS — Z00.6 RESEARCH STUDY PATIENT: ICD-10-CM

## 2024-06-06 LAB
APOB+LDLR+PCSK9 GENE MUT ANL BLD/T: NOT DETECTED
BRCA1+BRCA2 DEL+DUP + FULL MUT ANL BLD/T: NOT DETECTED
MLH1+MSH2+MSH6+PMS2 GN DEL+DUP+FUL M: NOT DETECTED

## 2025-05-22 ENCOUNTER — APPOINTMENT (OUTPATIENT)
Dept: MEDICAL GROUP | Facility: PHYSICIAN GROUP | Age: 30
End: 2025-05-22
Payer: COMMERCIAL

## 2025-05-22 VITALS
SYSTOLIC BLOOD PRESSURE: 108 MMHG | HEIGHT: 63 IN | TEMPERATURE: 97.8 F | BODY MASS INDEX: 26.58 KG/M2 | RESPIRATION RATE: 16 BRPM | DIASTOLIC BLOOD PRESSURE: 60 MMHG | OXYGEN SATURATION: 97 % | WEIGHT: 150 LBS | HEART RATE: 73 BPM

## 2025-05-22 DIAGNOSIS — Z13.1 ENCOUNTER FOR SCREENING FOR DIABETES MELLITUS: ICD-10-CM

## 2025-05-22 DIAGNOSIS — D50.0 IRON DEFICIENCY ANEMIA DUE TO CHRONIC BLOOD LOSS: ICD-10-CM

## 2025-05-22 DIAGNOSIS — Z23 NEED FOR VACCINATION: ICD-10-CM

## 2025-05-22 DIAGNOSIS — Z83.3 FAMILY HISTORY OF DIABETES MELLITUS: ICD-10-CM

## 2025-05-22 DIAGNOSIS — G43.109 MIGRAINE WITH AURA AND WITHOUT STATUS MIGRAINOSUS, NOT INTRACTABLE: ICD-10-CM

## 2025-05-22 DIAGNOSIS — Z00.00 WELL ADULT EXAM: Primary | ICD-10-CM

## 2025-05-22 PROCEDURE — 3078F DIAST BP <80 MM HG: CPT | Performed by: NURSE PRACTITIONER

## 2025-05-22 PROCEDURE — 99395 PREV VISIT EST AGE 18-39: CPT | Performed by: NURSE PRACTITIONER

## 2025-05-22 PROCEDURE — 3074F SYST BP LT 130 MM HG: CPT | Performed by: NURSE PRACTITIONER

## 2025-05-22 RX ORDER — SUMATRIPTAN 50 MG/1
50 TABLET, FILM COATED ORAL
Qty: 9 TABLET | Refills: 5 | Status: SHIPPED | OUTPATIENT
Start: 2025-05-22

## 2025-05-22 SDOH — ECONOMIC STABILITY: FOOD INSECURITY: WITHIN THE PAST 12 MONTHS, YOU WORRIED THAT YOUR FOOD WOULD RUN OUT BEFORE YOU GOT MONEY TO BUY MORE.: NEVER TRUE

## 2025-05-22 SDOH — ECONOMIC STABILITY: HOUSING INSECURITY
IN THE LAST 12 MONTHS, WAS THERE A TIME WHEN YOU DID NOT HAVE A STEADY PLACE TO SLEEP OR SLEPT IN A SHELTER (INCLUDING NOW)?: NO

## 2025-05-22 SDOH — HEALTH STABILITY: PHYSICAL HEALTH: ON AVERAGE, HOW MANY MINUTES DO YOU ENGAGE IN EXERCISE AT THIS LEVEL?: 60 MIN

## 2025-05-22 SDOH — ECONOMIC STABILITY: TRANSPORTATION INSECURITY
IN THE PAST 12 MONTHS, HAS LACK OF TRANSPORTATION KEPT YOU FROM MEETINGS, WORK, OR FROM GETTING THINGS NEEDED FOR DAILY LIVING?: NO

## 2025-05-22 SDOH — ECONOMIC STABILITY: INCOME INSECURITY: IN THE LAST 12 MONTHS, WAS THERE A TIME WHEN YOU WERE NOT ABLE TO PAY THE MORTGAGE OR RENT ON TIME?: NO

## 2025-05-22 SDOH — HEALTH STABILITY: PHYSICAL HEALTH: ON AVERAGE, HOW MANY DAYS PER WEEK DO YOU ENGAGE IN MODERATE TO STRENUOUS EXERCISE (LIKE A BRISK WALK)?: 3 DAYS

## 2025-05-22 SDOH — ECONOMIC STABILITY: INCOME INSECURITY: HOW HARD IS IT FOR YOU TO PAY FOR THE VERY BASICS LIKE FOOD, HOUSING, MEDICAL CARE, AND HEATING?: NOT VERY HARD

## 2025-05-22 SDOH — ECONOMIC STABILITY: TRANSPORTATION INSECURITY
IN THE PAST 12 MONTHS, HAS THE LACK OF TRANSPORTATION KEPT YOU FROM MEDICAL APPOINTMENTS OR FROM GETTING MEDICATIONS?: NO

## 2025-05-22 SDOH — ECONOMIC STABILITY: FOOD INSECURITY: WITHIN THE PAST 12 MONTHS, THE FOOD YOU BOUGHT JUST DIDN'T LAST AND YOU DIDN'T HAVE MONEY TO GET MORE.: NEVER TRUE

## 2025-05-22 SDOH — ECONOMIC STABILITY: TRANSPORTATION INSECURITY
IN THE PAST 12 MONTHS, HAS LACK OF RELIABLE TRANSPORTATION KEPT YOU FROM MEDICAL APPOINTMENTS, MEETINGS, WORK OR FROM GETTING THINGS NEEDED FOR DAILY LIVING?: NO

## 2025-05-22 SDOH — HEALTH STABILITY: MENTAL HEALTH
STRESS IS WHEN SOMEONE FEELS TENSE, NERVOUS, ANXIOUS, OR CAN'T SLEEP AT NIGHT BECAUSE THEIR MIND IS TROUBLED. HOW STRESSED ARE YOU?: NOT AT ALL

## 2025-05-22 ASSESSMENT — SOCIAL DETERMINANTS OF HEALTH (SDOH)
HOW HARD IS IT FOR YOU TO PAY FOR THE VERY BASICS LIKE FOOD, HOUSING, MEDICAL CARE, AND HEATING?: NOT VERY HARD
HOW OFTEN DO YOU ATTENT MEETINGS OF THE CLUB OR ORGANIZATION YOU BELONG TO?: NEVER
HOW OFTEN DO YOU GET TOGETHER WITH FRIENDS OR RELATIVES?: TWICE A WEEK
ARE YOU MARRIED, WIDOWED, DIVORCED, SEPARATED, NEVER MARRIED, OR LIVING WITH A PARTNER?: NEVER MARRIED
HOW OFTEN DO YOU HAVE A DRINK CONTAINING ALCOHOL: 2-4 TIMES A MONTH
HOW OFTEN DO YOU ATTEND CHURCH OR RELIGIOUS SERVICES?: NEVER
DO YOU BELONG TO ANY CLUBS OR ORGANIZATIONS SUCH AS CHURCH GROUPS UNIONS, FRATERNAL OR ATHLETIC GROUPS, OR SCHOOL GROUPS?: NO
HOW OFTEN DO YOU GET TOGETHER WITH FRIENDS OR RELATIVES?: TWICE A WEEK
ARE YOU MARRIED, WIDOWED, DIVORCED, SEPARATED, NEVER MARRIED, OR LIVING WITH A PARTNER?: NEVER MARRIED
IN A TYPICAL WEEK, HOW MANY TIMES DO YOU TALK ON THE PHONE WITH FAMILY, FRIENDS, OR NEIGHBORS?: MORE THAN THREE TIMES A WEEK
HOW MANY DRINKS CONTAINING ALCOHOL DO YOU HAVE ON A TYPICAL DAY WHEN YOU ARE DRINKING: 3 OR 4
IN A TYPICAL WEEK, HOW MANY TIMES DO YOU TALK ON THE PHONE WITH FAMILY, FRIENDS, OR NEIGHBORS?: MORE THAN THREE TIMES A WEEK
HOW OFTEN DO YOU ATTEND CHURCH OR RELIGIOUS SERVICES?: NEVER
WITHIN THE PAST 12 MONTHS, YOU WORRIED THAT YOUR FOOD WOULD RUN OUT BEFORE YOU GOT THE MONEY TO BUY MORE: NEVER TRUE
DO YOU BELONG TO ANY CLUBS OR ORGANIZATIONS SUCH AS CHURCH GROUPS UNIONS, FRATERNAL OR ATHLETIC GROUPS, OR SCHOOL GROUPS?: NO
HOW OFTEN DO YOU ATTENT MEETINGS OF THE CLUB OR ORGANIZATION YOU BELONG TO?: NEVER
HOW OFTEN DO YOU HAVE SIX OR MORE DRINKS ON ONE OCCASION: MONTHLY
IN THE PAST 12 MONTHS, HAS THE ELECTRIC, GAS, OIL, OR WATER COMPANY THREATENED TO SHUT OFF SERVICE IN YOUR HOME?: NO

## 2025-05-22 ASSESSMENT — LIFESTYLE VARIABLES
HOW MANY STANDARD DRINKS CONTAINING ALCOHOL DO YOU HAVE ON A TYPICAL DAY: 3 OR 4
SKIP TO QUESTIONS 9-10: 0
HOW OFTEN DO YOU HAVE A DRINK CONTAINING ALCOHOL: 2-4 TIMES A MONTH
AUDIT-C TOTAL SCORE: 5
HOW OFTEN DO YOU HAVE SIX OR MORE DRINKS ON ONE OCCASION: MONTHLY

## 2025-05-22 ASSESSMENT — FIBROSIS 4 INDEX: FIB4 SCORE: 0.53

## 2025-05-22 ASSESSMENT — PATIENT HEALTH QUESTIONNAIRE - PHQ9: CLINICAL INTERPRETATION OF PHQ2 SCORE: 0

## 2025-05-22 NOTE — PROGRESS NOTES
Subjective:     CC:   Chief Complaint   Patient presents with    Annual Exam       HPI:   Tyesha Meyers is a 29 y.o. female who presents for annual exam    Patient has GYN provider: Yes, [facility].   Last Pap Smear: 2022, is scheduled with her OB for a PAP   H/O Abnormal Pap: No  Last Mammogram: No family history. Will start screening at age 42.   Last Bone Density Test: N/A  Last Colorectal Cancer Screening: Maternal grandfather with history of colon cancer. Will start screening at age 47 with colonoscopy.   Last Tdap: 2019  Received HPV series: Yes    Patient's last menstrual period was 04/15/2025 (exact date). Irregular cycles with moderate to heavy bleeding.   Hx STDs: No  Birth control: None  Menses every month with 5-6 days with moderate, heavy bleeding.  Reports moderate cramping and does take OTC analgesics for cramps.  No significant bloating/fluid retention, pelvic pain, or dyspareunia. No abnormal vaginal discharge.  No breast tenderness, mass, nipple discharge, changes in size or contour, or abnormal cyclic discomfort.    Exercise: moderate regular exercise, aerobic < 3 days a week. Goes to the gym 3x a week.     Diet: her meals aren't as consistent. She does take a greens supplement and fiber supplement. Stays well hydrated.     Labs: Ordered today.     OB History   No obstetric history on file.      She  reports being sexually active and has had partner(s) who are male. She reports using the following method of birth control/protection: Condom.    She  has no past medical history on file.  She  has a past surgical history that includes tonsillectomy.    Family History   Problem Relation Age of Onset    Hypertension Mother     No Known Problems Father     No Known Problems Sister     No Known Problems Brother     No Known Problems Sister     Cancer Maternal Aunt         bone    Diabetes Maternal Grandmother     Cancer Maternal Grandfather         prostate    Diabetes Maternal  "Grandfather     Heart Disease Neg Hx      Social History[1]    Patient Active Problem List    Diagnosis Date Noted    Pain of right breast 02/16/2022    Migraine with aura and without status migrainosus, not intractable 07/01/2021     Current Medications[2]  Allergies[3]      Objective:   /60 (BP Location: Left arm, Patient Position: Sitting)   Pulse 73   Temp 36.6 °C (97.8 °F) (Temporal)   Resp 16   Ht 1.6 m (5' 3\")   Wt 68 kg (150 lb)   LMP 04/15/2025 (Exact Date)   SpO2 97%   BMI 26.57 kg/m²     Wt Readings from Last 4 Encounters:   05/22/25 68 kg (150 lb)   04/03/24 65.8 kg (145 lb)   09/15/23 64 kg (141 lb)   05/26/22 64.1 kg (141 lb 4.8 oz)       Physical Exam  Vitals reviewed.   Constitutional:       Appearance: Normal appearance.   HENT:      Head: Normocephalic and atraumatic.      Right Ear: Tympanic membrane and ear canal normal.      Left Ear: Tympanic membrane and ear canal normal.      Nose: Nose normal.      Mouth/Throat:      Mouth: Mucous membranes are moist.   Eyes:      Extraocular Movements: Extraocular movements intact.      Conjunctiva/sclera: Conjunctivae normal.      Pupils: Pupils are equal, round, and reactive to light.   Neck:      Thyroid: No thyromegaly.   Cardiovascular:      Rate and Rhythm: Normal rate and regular rhythm.      Pulses: Normal pulses.      Heart sounds: Normal heart sounds. No murmur heard.  Pulmonary:      Effort: Pulmonary effort is normal.      Breath sounds: Normal breath sounds.   Abdominal:      General: Abdomen is flat.      Palpations: Abdomen is soft.   Musculoskeletal:         General: Normal range of motion.      Cervical back: Normal range of motion and neck supple.   Lymphadenopathy:      Cervical: No cervical adenopathy.   Skin:     General: Skin is warm.   Neurological:      General: No focal deficit present.      Mental Status: She is alert and oriented to person, place, and time.   Psychiatric:         Mood and Affect: Mood normal.         " Behavior: Behavior normal.       Assessment and Plan:     1. Well adult exam    2. Migraine with aura and without status migrainosus, not intractable  - SUMAtriptan (IMITREX) 50 MG Tab; Take 1 Tablet by mouth 1 time a day as needed for Migraine. Repeat dose x1 if symptoms are still present 1 hours later. No more than 200 mg in a 24 hour span.  Dispense: 9 Tablet; Refill: 5  - Comp Metabolic Panel; Future    3. Need for vaccination    4. Encounter for screening for diabetes mellitus  - VITAMIN D,25 HYDROXY (DEFICIENCY); Future  - HEMOGLOBIN A1C; Future    5. Family history of diabetes mellitus  - Comp Metabolic Panel; Future  - HEMOGLOBIN A1C; Future    6. Iron deficiency anemia due to chronic blood loss  - CBC WITHOUT DIFFERENTIAL; Future  - FERRITIN; Future  - IRON/TOTAL IRON BIND; Future      Health maintenance: Age-appropriate anticipatory guidance discussed  Labs and imaging per orders  Immunizations per orders  Patient counseled about skin care, diet, supplements, and exercise.  Discussed  breast self exam, mammography screening, feminine hygiene, family planning choices, osteoporosis, adequate intake of calcium and vitamin D, diet and exercise, colorectal cancer screening     Follow-up: Return for as needed or yearly.         [1]   Social History  Tobacco Use    Smoking status: Never    Smokeless tobacco: Never   Vaping Use    Vaping status: Never Used   Substance Use Topics    Alcohol use: Not Currently    Drug use: Never   [2]   Current Outpatient Medications   Medication Sig Dispense Refill    SUMAtriptan (IMITREX) 50 MG Tab Take 1 Tablet by mouth 1 time a day as needed for Migraine. Repeat dose x1 if symptoms are still present 1 hours later. No more than 200 mg in a 24 hour span. 9 Tablet 5    Ascorbic Acid (VITAMIN C) 1000 MG Tab Take 1 Tablet by mouth every day.      ferrous sulfate 325 (65 Fe) MG tablet Take 325 mg by mouth 3 times a week.      Multiple Vitamin (MULTI-VITAMIN DAILY PO) Take 1 tablet by  mouth every day.      Cholecalciferol (VITAMIN D) 2000 UNIT Tab Take 2,000 Units by mouth every day.       No current facility-administered medications for this visit.   [3] No Known Allergies

## 2025-06-06 ENCOUNTER — HOSPITAL ENCOUNTER (OUTPATIENT)
Facility: MEDICAL CENTER | Age: 30
End: 2025-06-06
Payer: COMMERCIAL

## 2025-06-06 ENCOUNTER — OFFICE VISIT (OUTPATIENT)
Dept: OBGYN | Facility: CLINIC | Age: 30
End: 2025-06-06
Payer: COMMERCIAL

## 2025-06-06 VITALS
BODY MASS INDEX: 26.75 KG/M2 | WEIGHT: 151 LBS | DIASTOLIC BLOOD PRESSURE: 70 MMHG | HEIGHT: 63 IN | SYSTOLIC BLOOD PRESSURE: 119 MMHG

## 2025-06-06 DIAGNOSIS — Z01.419 WOMEN'S ANNUAL ROUTINE GYNECOLOGICAL EXAMINATION: Primary | ICD-10-CM

## 2025-06-06 DIAGNOSIS — Z11.3 SCREENING FOR STD (SEXUALLY TRANSMITTED DISEASE): ICD-10-CM

## 2025-06-06 DIAGNOSIS — N63.13 MASS OF LOWER OUTER QUADRANT OF RIGHT BREAST: ICD-10-CM

## 2025-06-06 DIAGNOSIS — N92.0 MENORRHAGIA WITH REGULAR CYCLE: ICD-10-CM

## 2025-06-06 DIAGNOSIS — N63.24 MASS OF LOWER INNER QUADRANT OF LEFT BREAST: ICD-10-CM

## 2025-06-06 LAB
CANDIDA DNA VAG QL PROBE+SIG AMP: NEGATIVE
G VAGINALIS DNA VAG QL PROBE+SIG AMP: NEGATIVE
T VAGINALIS DNA VAG QL PROBE+SIG AMP: NEGATIVE

## 2025-06-06 PROCEDURE — 87660 TRICHOMONAS VAGIN DIR PROBE: CPT

## 2025-06-06 PROCEDURE — 3078F DIAST BP <80 MM HG: CPT

## 2025-06-06 PROCEDURE — 87491 CHLMYD TRACH DNA AMP PROBE: CPT

## 2025-06-06 PROCEDURE — 87591 N.GONORRHOEAE DNA AMP PROB: CPT

## 2025-06-06 PROCEDURE — 3074F SYST BP LT 130 MM HG: CPT

## 2025-06-06 PROCEDURE — 88142 CYTOPATH C/V THIN LAYER: CPT

## 2025-06-06 PROCEDURE — 87480 CANDIDA DNA DIR PROBE: CPT

## 2025-06-06 PROCEDURE — 99459 PELVIC EXAMINATION: CPT

## 2025-06-06 PROCEDURE — 87510 GARDNER VAG DNA DIR PROBE: CPT

## 2025-06-06 PROCEDURE — 99385 PREV VISIT NEW AGE 18-39: CPT

## 2025-06-06 ASSESSMENT — FIBROSIS 4 INDEX: FIB4 SCORE: 0.53

## 2025-06-06 NOTE — PROGRESS NOTES
ANNUAL GYNECOLOGY VISIT    Chief Complaint  Annual    Subjective  Tyesha Meyers is a 29 y.o. female  Patient's last menstrual period was 2025 (exact date). Her and partner using condoms for contraception who presents today for Annual Exam.  She would like genital STD screening swabs completed today, declines blood work for STD screening.       Preventive Care   Immunization History   Administered Date(s) Administered    Dtap Vaccine 1998, 04/15/1998, 1998, 1999, 2002    HPV Quadrivalent Vaccine (GARDASIL) - HISTORICAL DATA 2009, 2009, 2009    Hep A, Unspecified Formulation 2002, 2003    Hepatitis A Vaccine, Adult 2002, 2003    Hepatitis B Vaccine Adolescent/Pediatric 1998, 04/15/1998, 1998    Hib Vaccine (Prp-d) - HISTORICAL DATA 1998, 04/15/1998, 1998, 1999    IPV 1998, 04/15/1998, 2002    Influenza Vaccine Pediatric Split - Historical Data 2007, 10/17/2009    Influenza Vaccine Quad Inj (Pf) 2019, 03/10/2021, 2022, 11/10/2022, 2024    Influenza Vaccine Quad Nasal 10/01/2013, 2015    Influenza, live, trivalent, intranasal 2013    MENING VAC SERO B 2-3 DOSE SCHED IM (TRUMENBA) 03/10/2021    MMR Vaccine 1999, 2002    Meningococcal Conjugate Vaccine MCV4 (Menactra) 2009, 2013    OPV TRIVALENT - HISTORICAL DATA (GIVEN PRIOR TO MAY 2016) 1999    PFIZER PURPLE CAP SARS-COV-2 VACCINATION (12+) 2021, 2021    TD Vaccine 2019    Tdap Vaccine 2009    Varicella Vaccine Live 1999, 2007       Guardasil HPV vaccine: Completed three dose series    Gynecology History and ROS  Current Sexual Activity: yes - one male partner  History of sexually transmitted diseases? no  Abnormal discharge? no  Current Contraception:  Condoms    Menstrual History  Patient's last menstrual period was 2025 (exact  date).  Periods are regular  q 28 days, lasting 6-7 days.   Clots or heavy flow: yes - clots and heavy flow. First three days changes super size tampon every hour. Clots are larger than a quarter.   Dysmenorrhea: yes - first 2-3 days. Ibuprofen helps   Intermenstrual bleeding/spotting: no  Significant pain with periods:no  Bothersome PMS symptoms: breast tenderness the week before  Significant Pelvic Pain: no    Pap History  Last pap smear: 2022 NILM  Denies hx of abnormal pap smears    Cancer Risk Assessement:  Family history of:   - Breast cancer: no   - Ovarian cancer: no   - Uterine cancer: no   - Colon cancer: no    Obstetric History  OB History    Para Term  AB Living   0 0 0 0 0 0   SAB IAB Ectopic Molar Multiple Live Births   0 0 0 0 0 0       Past Medical History  Past Medical History[1]    Past Surgical History  Past Surgical History[2]    Social History  Social History[3]     Family History  Family History   Problem Relation Age of Onset    Hypertension Mother     No Known Problems Father     No Known Problems Sister     No Known Problems Brother     No Known Problems Sister     Cancer Maternal Aunt         bone    Diabetes Maternal Grandmother     Cancer Maternal Grandfather         prostate    Diabetes Maternal Grandfather     Heart Disease Neg Hx        Home Medications  Current Outpatient Medications   Medication Sig    SUMAtriptan (IMITREX) 50 MG Tab Take 1 Tablet by mouth 1 time a day as needed for Migraine. Repeat dose x1 if symptoms are still present 1 hours later. No more than 200 mg in a 24 hour span.    Ascorbic Acid (VITAMIN C) 1000 MG Tab Take 1 Tablet by mouth every day.    ferrous sulfate 325 (65 Fe) MG tablet Take 325 mg by mouth 3 times a week.    Multiple Vitamin (MULTI-VITAMIN DAILY PO) Take 1 tablet by mouth every day.    Cholecalciferol (VITAMIN D) 2000 UNIT Tab Take 2,000 Units by mouth every day.       Allergies/Reactions  Allergies[4]    ROS  Positive ROS: See  "HPI   Gen: no fevers or chills, no significant weight loss or gain, excessive fatigue  Respiratory:  no cough or dyspnea  Cardiac:  no chest pain, no palpitations, no syncope  Breast: no breast discharge, pain, lump or skin changes  GI:  no heartburn, no abdominal pain, no nausea or vomiting  Urinary: no dysuria, urgency, frequency, incontinence   Psych: no depression or anxiety  Neuro: hx of migraines with aura. Denies fainting spells, numbness or tingling  Extremities: no joint pain, persistently swollen ankles, recurrent leg cramps      Physical Examination:  Vital Signs: /70 (BP Location: Left arm, Patient Position: Sitting, BP Cuff Size: Adult)   Ht 5' 3\"   Wt 151 lb   LMP 05/26/2025 (Exact Date)   BMI 26.75 kg/m²       Constitutional: The patient is well developed and well nourished.  Psychiatric: Patient is oriented to time place and person.   Skin: No rash observed.  Neck: Appears symmetric. Thyroid normal size  Respiratory: normal effort  Breast: Inspection reveals no asymetry or nipple discharge, no skin thickening, dimpling or erythema.  Palpation demonstrates no masses.  Abdomen: Soft, non-tender.  Pelvic Exam:      Vulva: external female genitalia are normal in appearance. No lesions     Urethra - no lesions, no erythema     Vagina: moist, pink, normal rugae, scant thick discharge on exam.     Cervix: pink, smooth, no lesions, no CMT     Uterus - non-tender, normal size, shape, contour, mobile, anteverted     Ovaries: non-tender, no appreciable masses    Pap Smear performed: Yes    Chaperone Present: Alhaji Mcdonald MA  Extremeties: Legs are symmetric and without tenderness. There is no edema present.        Assessment & Plan  Tyesha Meyers is a 29 y.o. female who presents today for Annual Gyn Exam.     Assessment & Plan  Women's annual routine gynecological examination  Anticipatory guidance given. Encouraged adequate water intake, healthy diet, regular exercise. Educated on Pap smear " screening and guidelines for age per ACOG and ASSCP. Discussed safe sex, STI prevention, contraception/family planning. Self breast exam taught.   Orders:    THINPREP RFLX HPV ASCUS W/CTNG    Menorrhagia with regular cycle  Patient having significant heavy bleeding and clots. Ordered pelvic US to understand if any structural changes may be causing the heavy bleeding. Patient already has CBC and iron studies ordered.   Orders:    US-PELVIC COMPLETE (TRANSABDOMINAL/TRANSVAGINAL) (COMBO); Future    Mass of lower outer quadrant of right breast  Pt had lump of right breast and US March of 2022 demonstrated no evidence of solid mass. Significantly tender in lower outer quadrant with small, approximately 1 cm lump palpated  Orders:    US-BREAST BILAT-COMPLETE; Future    Mass of lower inner quadrant of left breast  Approximately 0.5 cm mass of lower inner quadrant of left breast palpated.  Orders:    US-BREAST BILAT-COMPLETE; Future    Screening for STD (sexually transmitted disease)  Ordered the following for STD screening  Orders:    THINPREP RFLX HPV ASCUS W/CTNG    VAGINAL PATHOGENS DNA PANEL; Future      Return: Annually or PRN    JEN Arriola  [facility]            [1]   Past Medical History:  Diagnosis Date    Migraine    [2]   Past Surgical History:  Procedure Laterality Date    TONSILLECTOMY     [3]   Social History  Tobacco Use    Smoking status: Never    Smokeless tobacco: Never   Vaping Use    Vaping status: Never Used   Substance Use Topics    Alcohol use: Not Currently    Drug use: Never   [4] No Known Allergies

## 2025-06-06 NOTE — PROGRESS NOTES
Patient here for Gyn as new patient  LMP:5/26/2025  Last Pap:5/27/2022 NILM  BCM:Condoms  Phone number/Pharmacy verified  Pt states:No questions or concerns

## 2025-06-07 LAB
C TRACH DNA GENITAL QL NAA+PROBE: NEGATIVE
N GONORRHOEA DNA GENITAL QL NAA+PROBE: NEGATIVE
SPECIMEN SOURCE: NORMAL

## 2025-06-09 ENCOUNTER — RESULTS FOLLOW-UP (OUTPATIENT)
Dept: OBGYN | Facility: CLINIC | Age: 30
End: 2025-06-09
Payer: COMMERCIAL

## 2025-06-14 LAB — THINPREP PAP, CYTOLOGY NL11781: NORMAL
